# Patient Record
Sex: FEMALE | Race: WHITE | Employment: FULL TIME | URBAN - METROPOLITAN AREA
[De-identification: names, ages, dates, MRNs, and addresses within clinical notes are randomized per-mention and may not be internally consistent; named-entity substitution may affect disease eponyms.]

---

## 2017-01-11 ENCOUNTER — OP HISTORICAL/CONVERTED ENCOUNTER (OUTPATIENT)
Dept: OTHER | Age: 27
End: 2017-01-11

## 2017-02-01 ENCOUNTER — OP HISTORICAL/CONVERTED ENCOUNTER (OUTPATIENT)
Dept: OTHER | Age: 27
End: 2017-02-01

## 2017-03-29 ENCOUNTER — OP HISTORICAL/CONVERTED ENCOUNTER (OUTPATIENT)
Dept: OTHER | Age: 27
End: 2017-03-29

## 2017-05-11 ENCOUNTER — IP HISTORICAL/CONVERTED ENCOUNTER (OUTPATIENT)
Dept: OTHER | Age: 27
End: 2017-05-11

## 2019-06-11 LAB — PAP SMEAR, EXTERNAL: NORMAL

## 2020-06-10 LAB
GRBS, EXTERNAL: NEGATIVE
HBSAG, EXTERNAL: NEGATIVE
HIV, EXTERNAL: NEGATIVE
RPR, EXTERNAL: NONREACTIVE
RUBELLA, EXTERNAL: NORMAL

## 2020-08-31 VITALS
DIASTOLIC BLOOD PRESSURE: 71 MMHG | HEIGHT: 64 IN | HEART RATE: 79 BPM | SYSTOLIC BLOOD PRESSURE: 112 MMHG | TEMPERATURE: 98.6 F | WEIGHT: 149.8 LBS | BODY MASS INDEX: 25.57 KG/M2

## 2020-08-31 PROBLEM — Z91.89 AT HIGH RISK FOR BREAST CANCER: Status: ACTIVE | Noted: 2019-09-27

## 2020-08-31 RX ORDER — FLUVOXAMINE MALEATE 100 MG/1
TABLET, COATED ORAL EVERY EVENING
COMMUNITY
End: 2020-11-20 | Stop reason: ALTCHOICE

## 2020-08-31 RX ORDER — NORGESTIMATE AND ETHINYL ESTRADIOL 0.25-0.035
1 KIT ORAL DAILY
COMMUNITY
End: 2020-11-20 | Stop reason: ALTCHOICE

## 2020-09-01 ENCOUNTER — ROUTINE PRENATAL (OUTPATIENT)
Dept: OBGYN CLINIC | Age: 30
End: 2020-09-01
Payer: COMMERCIAL

## 2020-09-01 VITALS
BODY MASS INDEX: 26.8 KG/M2 | DIASTOLIC BLOOD PRESSURE: 73 MMHG | WEIGHT: 157 LBS | HEART RATE: 89 BPM | HEIGHT: 64 IN | SYSTOLIC BLOOD PRESSURE: 113 MMHG

## 2020-09-01 DIAGNOSIS — Z34.82 MULTIGRAVIDA IN SECOND TRIMESTER: ICD-10-CM

## 2020-09-01 DIAGNOSIS — O34.211 MATERNAL CARE DUE TO LOW TRANSVERSE UTERINE SCAR FROM PREVIOUS CESAREAN DELIVERY: ICD-10-CM

## 2020-09-01 DIAGNOSIS — Z34.92 PRENATAL CARE IN SECOND TRIMESTER: Primary | ICD-10-CM

## 2020-09-01 PROCEDURE — 0500F INITIAL PRENATAL CARE VISIT: CPT | Performed by: OBSTETRICS & GYNECOLOGY

## 2020-09-01 NOTE — PROGRESS NOTES
patient is a 39-year-old white female,  2 para 1001, last menstrual period 2020, [facility] 2021, estimated gestational age 24 weeks 4 days, history of primary  section for breech presentation, oligohydramnios on May 11, 2017, history of JOI-1 by colposcopic biopsy 2018, who presents for transfer of care. Patient reports clear white vaginal discharge, but denies itching odor pelvic pain or vaginal bleeding. She also denies bleeding, contractions, leakage of fluid and reports normal fetal movement. She is sexually active with the same partner since 2016. Last sexual encounter was 4 days ago, and she denies any pain or bleeding with intercourse. 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   Patient is a nonsmoker    Last Pap smear-2018-LGSIL    The labs reviewed  WBC 7.9  Hemoglobin 12.1  Hematocrit 35.5  Platelet count 207,377  Gonorrhea negative  Chlamydia negative  Hepatitis B surface antigen negative  Hepatitis C antibody negative  HIV negative  RPR nonreactive  Rubella immune  Trichomonas negative    Ultrasound 2020 last menstrual period 2020  Crown-rump length is consistent with 9 weeks 3 days    Past Medical History:   Diagnosis Date    Abnormal Papanicolaou smear of cervix 2019    LSIL    At high risk for breast cancer 2019     Past Surgical History:   Procedure Laterality Date    HX  SECTION      HX COLPOSCOPY      HX FREE SKIN GRAFT        Social History     Socioeconomic History    Marital status: LEGALLY      Spouse name: Not on file    Number of children: Not on file    Years of education: Not on file    Highest education level: Not on file   Tobacco Use    Smoking status: Never Smoker    Smokeless tobacco: Never Used   Substance and Sexual Activity    Alcohol use: Not Currently     Comment: Moderate    Drug use: Never    Sexual activity: Yes      Family History   Problem Relation Age of Onset    Breast Cancer Mother     Hypertension Father     Lung Cancer Maternal Uncle     Lung Cancer Paternal Uncle     Lung Cancer Paternal Grandfather         esophagus      Current Outpatient Medications on File Prior to Visit   Medication Sig Dispense Refill    fluvoxaMINE (LUVOX) 100 mg tablet Take  by mouth every evening.  norgestimate-ethinyl estradioL (Sprintec, 28,) 0.25-35 mg-mcg tab Take 1 Tab by mouth daily. No current facility-administered medications on file prior to visit. Allergies as of 09/01/2020 - never reviewed   Allergen Reaction Noted    Macrodantin [nitrofurantoin macrocrystal] Unable to Obtain 08/31/2020     Constitutional  General Appearance: healthy-appearing, well-nourished, well-developed (white female)  Level of Distress: NAD  Ambulation: ambulating normally    Psychiatric  Insight: good judgement  Mental Status: active and alert, normal mood, normal affect  Orientation: to time, to place, to person  Memory: recent memory normal, remote memory normal    Head  Head: normocephalic, atraumatic    Neck  Neck: supple, trachea midline, no masses, FROM  Lymph Nodes: no cervical LAD, no supraclavicular LAD, no axillary LAD, no inguinal LAD  Thyroid: no enlargement, non-tender, no nodules    Lungs  Respiratory effort: no dyspnea  Auscultation: breath sounds normal, good air movement, CTA except as noted, no wheezing, no rales/crackles, no rhonchi    Cardiovascular  Heart Auscultation: RRR, normal S1, normal S2, no murmurs, no rubs, no gallops  Pulses including femoral / pedal: normal throughout    Abdomen  Bowel Sounds: normal  Inspection and Palpation: soft, good, no tenderness, no guarding, no rebound tenderness, no masses, no CVA tenderness (well-healed Pfannenstiel incision)  Liver: non-tender, no hepatomegaly. Ultrasound demonstrates fetus in cephalic presentation; gender appears to be that of a female infant.   Fetal heart activity noted.  Fundal height 18 cm  Spleen: non-tender, no splenomegaly  Hernia: none palpable      Musculoskeletal:  Motor Strength and Tone: normal motor strength, normal tone  Joints, Bones, and Muscles: normal movement of all extremities, no bony abnormalities, no contractures, no malalignment, no tenderness  Extremities: no cyanosis, no edema, no varicosities, no palpable cord    Neurologic  Gait and Station: normal gait, normal station  Sensation: grossly intact  Reflexes: DTRs 2+ bilaterally throughout    Skin  Inspection and palpation: no rash, no lesions, no ulcer, no abnormal nevi, no induration, no nodules, good turgor, no jaundice, tattoo  Nails: normal    Back  Thoracolumbar Appearance: normal curvature      ICD-10-CM ICD-9-CM    1. Prenatal care in second trimester  Z34.92 V22.1 US PREG UTS > 14 WKS SNGL      AFP, MATERNAL SCREEN      GLUCOSE, GESTATIONAL 1 HR TOLERANCE      [id number] PLUS CORE (CHR21,18,13,SEX)   2. Multigravida in second trimester  Z34.82 V22.1    3.  Maternal care due to low transverse uterine scar from previous  delivery  O34.211 654.20      Labs reviewed  Still needs trisomy analysis, AFP, 1 hour glucose tolerance test  Prior section, will need counseling regarding  versus repeat   Also needs anatomy scan  Follow-up in 4 weeks

## 2020-10-08 ENCOUNTER — ROUTINE PRENATAL (OUTPATIENT)
Dept: OBGYN CLINIC | Age: 30
End: 2020-10-08
Payer: COMMERCIAL

## 2020-10-08 ENCOUNTER — HOSPITAL ENCOUNTER (OUTPATIENT)
Dept: MAMMOGRAPHY | Age: 30
Discharge: HOME | End: 2020-10-08
Attending: OBSTETRICS & GYNECOLOGY
Payer: COMMERCIAL

## 2020-10-08 VITALS
HEIGHT: 64 IN | WEIGHT: 161 LBS | SYSTOLIC BLOOD PRESSURE: 123 MMHG | BODY MASS INDEX: 27.49 KG/M2 | DIASTOLIC BLOOD PRESSURE: 69 MMHG

## 2020-10-08 DIAGNOSIS — Z34.82 MULTIGRAVIDA IN SECOND TRIMESTER: Primary | ICD-10-CM

## 2020-10-08 DIAGNOSIS — Z34.92 PRENATAL CARE IN SECOND TRIMESTER: ICD-10-CM

## 2020-10-08 DIAGNOSIS — O34.211 MATERNAL CARE DUE TO LOW TRANSVERSE UTERINE SCAR FROM PREVIOUS CESAREAN DELIVERY: ICD-10-CM

## 2020-10-08 PROCEDURE — 0500F INITIAL PRENATAL CARE VISIT: CPT | Performed by: OBSTETRICS & GYNECOLOGY

## 2020-10-08 PROCEDURE — 76805 OB US >/= 14 WKS SNGL FETUS: CPT

## 2020-10-15 ENCOUNTER — TELEPHONE (OUTPATIENT)
Dept: OBGYN CLINIC | Age: 30
End: 2020-10-15

## 2020-10-15 DIAGNOSIS — O35.EXX0 PYELECTASIS OF FETUS ON PRENATAL ULTRASOUND: Primary | ICD-10-CM

## 2020-10-15 NOTE — TELEPHONE ENCOUNTER
----- Message from Cori Tinajero MD sent at 10/15/2020  7:52 AM EDT -----  Needs MFM scan for pyelectasis

## 2020-10-15 NOTE — TELEPHONE ENCOUNTER
Spoke with patient advised that she needs to see high risk for pylectasis discussed with patient and advised not to be alarmed. Advised that it is relatively common finding in fetuses and sometimes resolves normally. Advised that they will discuss with her and she may need to see them frequently to follow-up. She voiced understanding.

## 2020-11-03 ENCOUNTER — ROUTINE PRENATAL (OUTPATIENT)
Dept: OBGYN CLINIC | Age: 30
End: 2020-11-03
Payer: COMMERCIAL

## 2020-11-03 VITALS
SYSTOLIC BLOOD PRESSURE: 121 MMHG | WEIGHT: 166 LBS | DIASTOLIC BLOOD PRESSURE: 64 MMHG | HEIGHT: 66 IN | BODY MASS INDEX: 26.68 KG/M2

## 2020-11-03 DIAGNOSIS — O34.211 MATERNAL CARE DUE TO LOW TRANSVERSE UTERINE SCAR FROM PREVIOUS CESAREAN DELIVERY: ICD-10-CM

## 2020-11-03 DIAGNOSIS — O35.EXX0 PYELECTASIS OF FETUS ON PRENATAL ULTRASOUND: ICD-10-CM

## 2020-11-03 DIAGNOSIS — Z34.83 MULTIGRAVIDA IN THIRD TRIMESTER: Primary | ICD-10-CM

## 2020-11-03 PROCEDURE — 0502F SUBSEQUENT PRENATAL CARE: CPT | Performed by: OBSTETRICS & GYNECOLOGY

## 2020-11-03 NOTE — PROGRESS NOTES
ket- 80 mg    #1 fetal pyelectasis-patient seen at [facility] today, and was told that the baby's kidneys look normal-no pyelectasis seen; written report is not available as yet  2. History of prior  section, patient was counseled regarding risks and benefits of  versus repeat  section. Specific risks of  discussed included uterine rupture, with fetal sequela of hypoxia, asphyxia, cerebral palsy, and death. Risk versus repeat  section were discussed including bleeding, transfusions, infections, visceral injuries to bowel, bladder, vascular structures, ureters. Patient was also counseled regarding potential risks of abruption and placenta accreta with successive repeat  section. Logistics and rationale of hospital coverage of both anesthesia, and obstetrical staff discussed. Several scenarios were discussed. Limitations of available cervical ripening agents were discussed. Potential increased risk of ruptured was discussed with the use of prostaglandins. Patient was given an opportunity to ask questions, all questions were answered, patient voiced understanding of above.     Tdap, flu vaccine discussed Patient states she is already gotten the flu vaccine -Tdap recommended

## 2020-11-20 ENCOUNTER — ROUTINE PRENATAL (OUTPATIENT)
Dept: OBGYN CLINIC | Age: 30
End: 2020-11-20
Payer: COMMERCIAL

## 2020-11-20 VITALS
WEIGHT: 169 LBS | DIASTOLIC BLOOD PRESSURE: 70 MMHG | BODY MASS INDEX: 27.28 KG/M2 | SYSTOLIC BLOOD PRESSURE: 120 MMHG | HEART RATE: 97 BPM

## 2020-11-20 DIAGNOSIS — O34.211 MATERNAL CARE DUE TO LOW TRANSVERSE UTERINE SCAR FROM PREVIOUS CESAREAN DELIVERY: ICD-10-CM

## 2020-11-20 DIAGNOSIS — Z34.83 MULTIGRAVIDA IN THIRD TRIMESTER: Primary | ICD-10-CM

## 2020-11-20 PROCEDURE — 0502F SUBSEQUENT PRENATAL CARE: CPT | Performed by: OBSTETRICS & GYNECOLOGY

## 2020-11-28 NOTE — PROGRESS NOTES
1.  Fetal pyelectasis-was not seen at MFM scan  2.   Prior D-nadxcdk-fibolcdkhzb  versus repeat     Had flu vaccine, and Tdap

## 2020-12-08 ENCOUNTER — TELEPHONE (OUTPATIENT)
Dept: OBGYN CLINIC | Age: 30
End: 2020-12-08

## 2020-12-08 NOTE — TELEPHONE ENCOUNTER
J Carlos Walker called pt is asking for a continuity of coverage so she can have the baby at [facility]. The pt has already done all of her paperwork for them but now they need a call placed to them at [address] [place] [address] to get approval from Dr. Deja Worthington also. Please give them a call as soon as possible to get this taken care of.  The number for [facility] is # [phone]

## 2020-12-21 ENCOUNTER — ROUTINE PRENATAL (OUTPATIENT)
Dept: OBGYN CLINIC | Age: 30
End: 2020-12-21
Payer: COMMERCIAL

## 2020-12-21 VITALS
HEIGHT: 64 IN | HEART RATE: 83 BPM | DIASTOLIC BLOOD PRESSURE: 73 MMHG | SYSTOLIC BLOOD PRESSURE: 125 MMHG | BODY MASS INDEX: 29.88 KG/M2 | WEIGHT: 175 LBS

## 2020-12-21 DIAGNOSIS — Z34.83 MULTIGRAVIDA IN THIRD TRIMESTER: Primary | ICD-10-CM

## 2020-12-21 DIAGNOSIS — O34.211 MATERNAL CARE DUE TO LOW TRANSVERSE UTERINE SCAR FROM PREVIOUS CESAREAN DELIVERY: ICD-10-CM

## 2020-12-21 DIAGNOSIS — O35.EXX0 PYELECTASIS OF FETUS ON PRENATAL ULTRASOUND: ICD-10-CM

## 2020-12-21 PROCEDURE — 0502F SUBSEQUENT PRENATAL CARE: CPT | Performed by: OBSTETRICS & GYNECOLOGY

## 2020-12-21 NOTE — PROGRESS NOTES
1. Prior C-nnzyfka-tatndtugjqd  versus repeat  discussed considerations once again  2.   Fetal pyelectasis

## 2020-12-24 LAB — B-HEM STREP SPEC QL CULT: NEGATIVE

## 2020-12-28 ENCOUNTER — ROUTINE PRENATAL (OUTPATIENT)
Dept: OBGYN CLINIC | Age: 30
End: 2020-12-28
Payer: COMMERCIAL

## 2020-12-28 VITALS
DIASTOLIC BLOOD PRESSURE: 93 MMHG | SYSTOLIC BLOOD PRESSURE: 127 MMHG | HEART RATE: 106 BPM | BODY MASS INDEX: 29.87 KG/M2 | WEIGHT: 174 LBS

## 2020-12-28 DIAGNOSIS — O34.211 MATERNAL CARE DUE TO LOW TRANSVERSE UTERINE SCAR FROM PREVIOUS CESAREAN DELIVERY: ICD-10-CM

## 2020-12-28 DIAGNOSIS — Z13.6 SCREENING FOR CARDIOVASCULAR, RESPIRATORY, AND GENITOURINARY DISEASES: ICD-10-CM

## 2020-12-28 DIAGNOSIS — Z34.83 MULTIGRAVIDA IN THIRD TRIMESTER: Primary | ICD-10-CM

## 2020-12-28 DIAGNOSIS — Z13.83 SCREENING FOR CARDIOVASCULAR, RESPIRATORY, AND GENITOURINARY DISEASES: ICD-10-CM

## 2020-12-28 DIAGNOSIS — Z13.89 SCREENING FOR CARDIOVASCULAR, RESPIRATORY, AND GENITOURINARY DISEASES: ICD-10-CM

## 2020-12-28 PROCEDURE — 0502F SUBSEQUENT PRENATAL CARE: CPT | Performed by: OBSTETRICS & GYNECOLOGY

## 2020-12-28 NOTE — PROGRESS NOTES
1. Prior section-considering  versus repeat section  2.   Fetal pyelectasis    GBS pending  Covid ordered

## 2021-01-07 ENCOUNTER — ROUTINE PRENATAL (OUTPATIENT)
Dept: OBGYN CLINIC | Age: 31
End: 2021-01-07
Payer: COMMERCIAL

## 2021-01-07 ENCOUNTER — HOSPITAL ENCOUNTER (OUTPATIENT)
Dept: PREADMISSION TESTING | Age: 31
Discharge: HOME | End: 2021-01-07
Payer: COMMERCIAL

## 2021-01-07 VITALS
DIASTOLIC BLOOD PRESSURE: 72 MMHG | SYSTOLIC BLOOD PRESSURE: 105 MMHG | HEIGHT: 64 IN | BODY MASS INDEX: 30.56 KG/M2 | WEIGHT: 179 LBS | HEART RATE: 94 BPM

## 2021-01-07 DIAGNOSIS — O35.EXX0 PYELECTASIS OF FETUS ON PRENATAL ULTRASOUND: ICD-10-CM

## 2021-01-07 DIAGNOSIS — Z34.83 MULTIGRAVIDA IN THIRD TRIMESTER: Primary | ICD-10-CM

## 2021-01-07 DIAGNOSIS — O34.211 MATERNAL CARE DUE TO LOW TRANSVERSE UTERINE SCAR FROM PREVIOUS CESAREAN DELIVERY: ICD-10-CM

## 2021-01-07 LAB — SARS-COV-2, COV2: NORMAL

## 2021-01-07 PROCEDURE — 87635 SARS-COV-2 COVID-19 AMP PRB: CPT

## 2021-01-07 PROCEDURE — 0502F SUBSEQUENT PRENATAL CARE: CPT | Performed by: OBSTETRICS & GYNECOLOGY

## 2021-01-08 LAB — SARS-COV-2, COV2NT: NOT DETECTED

## 2021-01-13 ENCOUNTER — ROUTINE PRENATAL (OUTPATIENT)
Dept: OBGYN CLINIC | Age: 31
End: 2021-01-13
Payer: COMMERCIAL

## 2021-01-13 VITALS
WEIGHT: 178 LBS | HEIGHT: 64 IN | DIASTOLIC BLOOD PRESSURE: 70 MMHG | SYSTOLIC BLOOD PRESSURE: 120 MMHG | BODY MASS INDEX: 30.39 KG/M2

## 2021-01-13 DIAGNOSIS — Z34.80 SUPERVISION OF OTHER NORMAL PREGNANCY: Primary | ICD-10-CM

## 2021-01-13 DIAGNOSIS — Z34.83 MULTIGRAVIDA IN THIRD TRIMESTER: ICD-10-CM

## 2021-01-13 DIAGNOSIS — O34.211 MATERNAL CARE DUE TO LOW TRANSVERSE UTERINE SCAR FROM PREVIOUS CESAREAN DELIVERY: ICD-10-CM

## 2021-01-13 LAB
BILIRUB UR QL STRIP: NEGATIVE
GLUCOSE UR-MCNC: NEGATIVE MG/DL
KETONES P FAST UR STRIP-MCNC: NEGATIVE MG/DL
PH UR STRIP: 6.5 [PH] (ref 4.6–8)
PROT UR QL STRIP: NEGATIVE
SP GR UR STRIP: 1.02 (ref 1–1.03)
UA UROBILINOGEN AMB POC: NORMAL (ref 0.2–1)
URINALYSIS CLARITY POC: CLEAR
URINALYSIS COLOR POC: YELLOW
URINE BLOOD POC: NEGATIVE
URINE LEUKOCYTES POC: NEGATIVE
URINE NITRITES POC: NEGATIVE

## 2021-01-13 PROCEDURE — 0502F SUBSEQUENT PRENATAL CARE: CPT | Performed by: OBSTETRICS & GYNECOLOGY

## 2021-01-17 NOTE — PROGRESS NOTES
1. Prior E-pzygetr-onqtj of  once again reviewed including uterine rupture,, fetal hypoxia, asphyxia, cerebral palsy, death, versus risks of  section including bleeding, transfusions, visceral injuries to bowel and bladder, as well as increased risk of placental complications such as placenta previa, and placental abruption. Logistics and rationale of  policies discussed. Patient was given an opportunity to ask questions, all questions were answered, patient voiced understanding of above.   #2 fetal pyelectasis

## 2021-01-18 ENCOUNTER — HOSPITAL ENCOUNTER (OUTPATIENT)
Dept: PREADMISSION TESTING | Age: 31
Discharge: HOME | End: 2021-01-18
Payer: COMMERCIAL

## 2021-01-18 LAB — SARS-COV-2, COV2: NORMAL

## 2021-01-18 PROCEDURE — U0003 INFECTIOUS AGENT DETECTION BY NUCLEIC ACID (DNA OR RNA); SEVERE ACUTE RESPIRATORY SYNDROME CORONAVIRUS 2 (SARS-COV-2) (CORONAVIRUS DISEASE [COVID-19]), AMPLIFIED PROBE TECHNIQUE, MAKING USE OF HIGH THROUGHPUT TECHNOLOGIES AS DESCRIBED BY CMS-2020-01-R: HCPCS

## 2021-01-19 LAB — SARS-COV-2, COV2NT: NOT DETECTED

## 2021-01-21 ENCOUNTER — ROUTINE PRENATAL (OUTPATIENT)
Dept: OBGYN CLINIC | Age: 31
End: 2021-01-21
Payer: COMMERCIAL

## 2021-01-21 VITALS
DIASTOLIC BLOOD PRESSURE: 70 MMHG | HEIGHT: 67 IN | WEIGHT: 179 LBS | SYSTOLIC BLOOD PRESSURE: 123 MMHG | BODY MASS INDEX: 28.09 KG/M2

## 2021-01-21 DIAGNOSIS — Z34.83 MULTIGRAVIDA IN THIRD TRIMESTER: Primary | ICD-10-CM

## 2021-01-21 DIAGNOSIS — O34.211 MATERNAL CARE DUE TO LOW TRANSVERSE UTERINE SCAR FROM PREVIOUS CESAREAN DELIVERY: ICD-10-CM

## 2021-01-21 DIAGNOSIS — O35.EXX0 PYELECTASIS OF FETUS ON PRENATAL ULTRASOUND: ICD-10-CM

## 2021-01-21 PROCEDURE — 0502F SUBSEQUENT PRENATAL CARE: CPT | Performed by: OBSTETRICS & GYNECOLOGY

## 2021-01-22 ENCOUNTER — ANESTHESIA (OUTPATIENT)
Dept: LABOR AND DELIVERY | Age: 31
End: 2021-01-22
Payer: COMMERCIAL

## 2021-01-22 ENCOUNTER — ANESTHESIA EVENT (OUTPATIENT)
Dept: LABOR AND DELIVERY | Age: 31
End: 2021-01-22
Payer: COMMERCIAL

## 2021-01-22 ENCOUNTER — HOSPITAL ENCOUNTER (INPATIENT)
Age: 31
LOS: 2 days | Discharge: HOME | End: 2021-01-24
Attending: OBSTETRICS & GYNECOLOGY | Admitting: OBSTETRICS & GYNECOLOGY
Payer: COMMERCIAL

## 2021-01-22 VITALS — HEART RATE: 100 BPM | DIASTOLIC BLOOD PRESSURE: 49 MMHG | SYSTOLIC BLOOD PRESSURE: 124 MMHG | OXYGEN SATURATION: 100 %

## 2021-01-22 DIAGNOSIS — O34.219 VBAC, DELIVERED: Primary | ICD-10-CM

## 2021-01-22 PROBLEM — O34.211 ENCOUNTER FOR MATERNAL CARE FOR LOW TRANSVERSE SCAR FROM PREVIOUS CESAREAN DELIVERY: Status: ACTIVE | Noted: 2021-01-22

## 2021-01-22 PROBLEM — Z3A.40 40 WEEKS GESTATION OF PREGNANCY: Status: ACTIVE | Noted: 2021-01-22

## 2021-01-22 LAB
ABO + RH BLD: NORMAL
BASOPHILS # BLD: 0.1 K/UL (ref 0–0.1)
BASOPHILS NFR BLD: 0 % (ref 0–1)
BLOOD GROUP ANTIBODIES SERPL: NEGATIVE
DIFFERENTIAL METHOD BLD: ABNORMAL
EOSINOPHIL # BLD: 0.1 K/UL (ref 0–0.4)
EOSINOPHIL NFR BLD: 1 % (ref 0–7)
ERYTHROCYTE [DISTWIDTH] IN BLOOD BY AUTOMATED COUNT: 14.3 % (ref 11.5–14.5)
HBV SURFACE AG SER QL: <0.1 INDEX
HBV SURFACE AG SER QL: NEGATIVE
HCT VFR BLD AUTO: 32.9 % (ref 35–47)
HGB BLD-MCNC: 10.5 G/DL (ref 11.5–16)
HIV 1+2 AB+HIV1 P24 AG SERPL QL IA: NONREACTIVE
HIV12 RESULT COMMENT, HHIVC: NORMAL
IMM GRANULOCYTES # BLD AUTO: 0.1 K/UL (ref 0–0.04)
IMM GRANULOCYTES NFR BLD AUTO: 1 % (ref 0–0.5)
LYMPHOCYTES # BLD: 3.2 K/UL (ref 0.8–3.5)
LYMPHOCYTES NFR BLD: 25 % (ref 12–49)
MCH RBC QN AUTO: 25.6 PG (ref 26–34)
MCHC RBC AUTO-ENTMCNC: 31.9 G/DL (ref 30–36.5)
MCV RBC AUTO: 80.2 FL (ref 80–99)
MONOCYTES # BLD: 1.1 K/UL (ref 0–1)
MONOCYTES NFR BLD: 9 % (ref 5–13)
NEUTS SEG # BLD: 8.2 K/UL (ref 1.8–8)
NEUTS SEG NFR BLD: 64 % (ref 32–75)
PLATELET # BLD AUTO: 435 K/UL (ref 150–400)
PMV BLD AUTO: 9.6 FL (ref 8.9–12.9)
RBC # BLD AUTO: 4.1 M/UL (ref 3.8–5.2)
RUBV IGG SER-IMP: REACTIVE
RUBV IGG SERPL IA-ACNC: 21.5 IU/ML
SPECIMEN EXP DATE BLD: NORMAL
WBC # BLD AUTO: 12.7 K/UL (ref 3.6–11)

## 2021-01-22 PROCEDURE — 81001 URINALYSIS AUTO W/SCOPE: CPT

## 2021-01-22 PROCEDURE — 74011250636 HC RX REV CODE- 250/636: Performed by: OBSTETRICS & GYNECOLOGY

## 2021-01-22 PROCEDURE — 59200 INSERT CERVICAL DILATOR: CPT

## 2021-01-22 PROCEDURE — 74011250637 HC RX REV CODE- 250/637: Performed by: OBSTETRICS & GYNECOLOGY

## 2021-01-22 PROCEDURE — 87086 URINE CULTURE/COLONY COUNT: CPT

## 2021-01-22 PROCEDURE — 77030014125 HC TY EPDRL BBMI -B: Performed by: ANESTHESIOLOGY

## 2021-01-22 PROCEDURE — 75410000003 HC RECOV DEL/VAG/CSECN EA 0.5 HR

## 2021-01-22 PROCEDURE — 80081 OBSTETRIC PANEL INC HIV TSTG: CPT

## 2021-01-22 PROCEDURE — 74011000250 HC RX REV CODE- 250: Performed by: NURSE ANESTHETIST, CERTIFIED REGISTERED

## 2021-01-22 PROCEDURE — 74011250636 HC RX REV CODE- 250/636: Performed by: NURSE ANESTHETIST, CERTIFIED REGISTERED

## 2021-01-22 PROCEDURE — 0KQM0ZZ REPAIR PERINEUM MUSCLE, OPEN APPROACH: ICD-10-PCS | Performed by: OBSTETRICS & GYNECOLOGY

## 2021-01-22 PROCEDURE — 74011000250 HC RX REV CODE- 250

## 2021-01-22 PROCEDURE — 75410000000 HC DELIVERY VAGINAL/SINGLE

## 2021-01-22 PROCEDURE — 3E0S3BZ INTRODUCTION OF ANESTHETIC AGENT INTO EPIDURAL SPACE, PERCUTANEOUS APPROACH: ICD-10-PCS | Performed by: OBSTETRICS & GYNECOLOGY

## 2021-01-22 PROCEDURE — 36415 COLL VENOUS BLD VENIPUNCTURE: CPT

## 2021-01-22 PROCEDURE — 75410000002 HC LABOR FEE PER 1 HR

## 2021-01-22 PROCEDURE — 65410000002 HC RM PRIVATE OB

## 2021-01-22 PROCEDURE — 76060000078 HC EPIDURAL ANESTHESIA

## 2021-01-22 RX ORDER — FENTANYL/BUPIVACAINE/NS/PF 2-1250MCG
1-18 PREFILLED PUMP RESERVOIR EPIDURAL
Status: DISCONTINUED | OUTPATIENT
Start: 2021-01-22 | End: 2021-01-22

## 2021-01-22 RX ORDER — SODIUM CHLORIDE 0.9 % (FLUSH) 0.9 %
5-40 SYRINGE (ML) INJECTION EVERY 8 HOURS
Status: DISCONTINUED | OUTPATIENT
Start: 2021-01-22 | End: 2021-01-24 | Stop reason: HOSPADM

## 2021-01-22 RX ORDER — OXYCODONE AND ACETAMINOPHEN 5; 325 MG/1; MG/1
1 TABLET ORAL
Status: DISCONTINUED | OUTPATIENT
Start: 2021-01-22 | End: 2021-01-24 | Stop reason: HOSPADM

## 2021-01-22 RX ORDER — LIDOCAINE HYDROCHLORIDE AND EPINEPHRINE 15; 5 MG/ML; UG/ML
INJECTION, SOLUTION EPIDURAL
Status: SHIPPED | OUTPATIENT
Start: 2021-01-22 | End: 2021-01-22

## 2021-01-22 RX ORDER — ONDANSETRON 2 MG/ML
4 INJECTION INTRAMUSCULAR; INTRAVENOUS
Status: DISCONTINUED | OUTPATIENT
Start: 2021-01-22 | End: 2021-01-23 | Stop reason: HOSPADM

## 2021-01-22 RX ORDER — ONDANSETRON 2 MG/ML
4 INJECTION INTRAMUSCULAR; INTRAVENOUS AS NEEDED
Status: DISCONTINUED | OUTPATIENT
Start: 2021-01-22 | End: 2021-01-22

## 2021-01-22 RX ORDER — OXYTOCIN/0.9 % SODIUM CHLORIDE 30/500 ML
2 PLASTIC BAG, INJECTION (ML) INTRAVENOUS
Status: DISCONTINUED | OUTPATIENT
Start: 2021-01-22 | End: 2021-01-22

## 2021-01-22 RX ORDER — BISACODYL 5 MG
5 TABLET, DELAYED RELEASE (ENTERIC COATED) ORAL DAILY PRN
Status: DISCONTINUED | OUTPATIENT
Start: 2021-01-22 | End: 2021-01-23 | Stop reason: HOSPADM

## 2021-01-22 RX ORDER — NALOXONE HYDROCHLORIDE 0.4 MG/ML
0.4 INJECTION, SOLUTION INTRAMUSCULAR; INTRAVENOUS; SUBCUTANEOUS AS NEEDED
Status: DISCONTINUED | OUTPATIENT
Start: 2021-01-22 | End: 2021-01-24 | Stop reason: HOSPADM

## 2021-01-22 RX ORDER — EPHEDRINE SULFATE/0.9% NACL/PF 50 MG/5 ML
SYRINGE (ML) INTRAVENOUS AS NEEDED
Status: DISCONTINUED | OUTPATIENT
Start: 2021-01-22 | End: 2021-01-22 | Stop reason: HOSPADM

## 2021-01-22 RX ORDER — OXYTOCIN/RINGER'S LACTATE 30/500 ML
87.3 PLASTIC BAG, INJECTION (ML) INTRAVENOUS AS NEEDED
Status: COMPLETED | OUTPATIENT
Start: 2021-01-22 | End: 2021-01-22

## 2021-01-22 RX ORDER — BUPIVACAINE HYDROCHLORIDE 2.5 MG/ML
INJECTION, SOLUTION EPIDURAL; INFILTRATION; INTRACAUDAL
Status: COMPLETED
Start: 2021-01-22 | End: 2021-01-22

## 2021-01-22 RX ORDER — SODIUM CHLORIDE 0.9 % (FLUSH) 0.9 %
5-40 SYRINGE (ML) INJECTION AS NEEDED
Status: DISCONTINUED | OUTPATIENT
Start: 2021-01-22 | End: 2021-01-24 | Stop reason: HOSPADM

## 2021-01-22 RX ORDER — ZOLPIDEM TARTRATE 5 MG/1
5 TABLET ORAL
Status: DISCONTINUED | OUTPATIENT
Start: 2021-01-22 | End: 2021-01-24 | Stop reason: HOSPADM

## 2021-01-22 RX ORDER — HYDROCORTISONE ACETATE PRAMOXINE HCL 1; 1 G/100G; G/100G
CREAM TOPICAL AS NEEDED
Status: DISCONTINUED | OUTPATIENT
Start: 2021-01-22 | End: 2021-01-23

## 2021-01-22 RX ORDER — BUPIVACAINE HYDROCHLORIDE 2.5 MG/ML
INJECTION, SOLUTION EPIDURAL; INFILTRATION; INTRACAUDAL AS NEEDED
Status: DISCONTINUED | OUTPATIENT
Start: 2021-01-22 | End: 2021-01-22 | Stop reason: HOSPADM

## 2021-01-22 RX ORDER — PHENYLEPHRINE HCL IN 0.9% NACL 1 MG/10 ML
SYRINGE (ML) INTRAVENOUS
Status: DISPENSED
Start: 2021-01-22 | End: 2021-01-22

## 2021-01-22 RX ORDER — DIPHENHYDRAMINE HYDROCHLORIDE 50 MG/ML
12.5 INJECTION, SOLUTION INTRAMUSCULAR; INTRAVENOUS
Status: DISCONTINUED | OUTPATIENT
Start: 2021-01-22 | End: 2021-01-24 | Stop reason: HOSPADM

## 2021-01-22 RX ORDER — SODIUM CHLORIDE, SODIUM LACTATE, POTASSIUM CHLORIDE, CALCIUM CHLORIDE 600; 310; 30; 20 MG/100ML; MG/100ML; MG/100ML; MG/100ML
125 INJECTION, SOLUTION INTRAVENOUS CONTINUOUS
Status: DISCONTINUED | OUTPATIENT
Start: 2021-01-22 | End: 2021-01-22

## 2021-01-22 RX ORDER — NORETHINDRONE AND ETHINYL ESTRADIOL 0.5-0.035
KIT ORAL
Status: DISCONTINUED
Start: 2021-01-22 | End: 2021-01-22

## 2021-01-22 RX ORDER — NORETHINDRONE AND ETHINYL ESTRADIOL 0.5-0.035
12.5 KIT ORAL ONCE
Status: DISCONTINUED | OUTPATIENT
Start: 2021-01-22 | End: 2021-01-22

## 2021-01-22 RX ORDER — LIDOCAINE HYDROCHLORIDE 10 MG/ML
INJECTION INFILTRATION; PERINEURAL
Status: COMPLETED
Start: 2021-01-22 | End: 2021-01-22

## 2021-01-22 RX ORDER — IBUPROFEN 800 MG/1
800 TABLET ORAL EVERY 8 HOURS
Status: DISCONTINUED | OUTPATIENT
Start: 2021-01-22 | End: 2021-01-24 | Stop reason: HOSPADM

## 2021-01-22 RX ORDER — FENTANYL 0.2 MG/100ML-BUPIV 0.125%-NACL 0.9% EPIDURAL INJ 2/0.125 MCG/ML-%
SOLUTION INJECTION
Status: COMPLETED
Start: 2021-01-22 | End: 2021-01-22

## 2021-01-22 RX ORDER — OXYTOCIN/RINGER'S LACTATE 30/500 ML
10 PLASTIC BAG, INJECTION (ML) INTRAVENOUS AS NEEDED
Status: COMPLETED | OUTPATIENT
Start: 2021-01-22 | End: 2021-01-22

## 2021-01-22 RX ORDER — NALOXONE HYDROCHLORIDE 0.4 MG/ML
0.4 INJECTION, SOLUTION INTRAMUSCULAR; INTRAVENOUS; SUBCUTANEOUS AS NEEDED
Status: DISCONTINUED | OUTPATIENT
Start: 2021-01-22 | End: 2021-01-22

## 2021-01-22 RX ADMIN — Medication 8 ML/HR: at 11:10

## 2021-01-22 RX ADMIN — OXYTOCIN-SODIUM CHLORIDE 0.9% IV SOLN 30 UNIT/500ML 10000 MILLI-UNITS: 30-0.9/5 SOLUTION at 22:00

## 2021-01-22 RX ADMIN — BUPIVACAINE HYDROCHLORIDE 10 ML: 2.5 INJECTION, SOLUTION EPIDURAL; INFILTRATION; INTRACAUDAL at 10:28

## 2021-01-22 RX ADMIN — DIPHENHYDRAMINE HYDROCHLORIDE 12.5 MG: 50 INJECTION, SOLUTION INTRAMUSCULAR; INTRAVENOUS at 14:33

## 2021-01-22 RX ADMIN — OXYTOCIN-SODIUM CHLORIDE 0.9% IV SOLN 30 UNIT/500ML 8 MILLI-UNITS/MIN: 30-0.9/5 SOLUTION at 18:20

## 2021-01-22 RX ADMIN — LIDOCAINE HYDROCHLORIDE: 10 INJECTION, SOLUTION INFILTRATION; PERINEURAL at 21:35

## 2021-01-22 RX ADMIN — SODIUM CHLORIDE, POTASSIUM CHLORIDE, SODIUM LACTATE AND CALCIUM CHLORIDE 125 ML/HR: 600; 310; 30; 20 INJECTION, SOLUTION INTRAVENOUS at 11:59

## 2021-01-22 RX ADMIN — PHENYLEPHRINE HYDROCHLORIDE 200 MCG: 10 INJECTION INTRAVENOUS at 10:36

## 2021-01-22 RX ADMIN — OXYTOCIN-SODIUM CHLORIDE 0.9% IV SOLN 30 UNIT/500ML 2 MILLI-UNITS/MIN: 30-0.9/5 SOLUTION at 16:37

## 2021-01-22 RX ADMIN — LIDOCAINE HYDROCHLORIDE AND EPINEPHRINE 3 ML: 15; 5 INJECTION, SOLUTION EPIDURAL at 10:25

## 2021-01-22 RX ADMIN — Medication 87.3 MILLI-UNITS/MIN: at 22:20

## 2021-01-22 RX ADMIN — BUPIVACAINE HYDROCHLORIDE 5 ML: 2.5 INJECTION, SOLUTION EPIDURAL; INFILTRATION; INTRACAUDAL at 20:39

## 2021-01-22 RX ADMIN — Medication 10 MG: at 10:49

## 2021-01-22 RX ADMIN — SODIUM CHLORIDE, POTASSIUM CHLORIDE, SODIUM LACTATE AND CALCIUM CHLORIDE 125 ML/HR: 600; 310; 30; 20 INJECTION, SOLUTION INTRAVENOUS at 07:30

## 2021-01-22 RX ADMIN — IBUPROFEN 800 MG: 800 TABLET ORAL at 23:46

## 2021-01-22 NOTE — PROGRESS NOTES
0550: Pt arrived on the unit with  \"TJ. \" Pt endorses positive fetal movement. Pt denies vaginal bleeding, discharge, or contractions. Pt instructed to change into gown and void in urine cup.

## 2021-01-22 NOTE — ANESTHESIA PREPROCEDURE EVALUATION
Relevant Problems   NEUROLOGY   (+) Generalized anxiety disorder       Anesthetic History   No history of anesthetic complications            Review of Systems / Medical History  Patient summary reviewed, nursing notes reviewed and pertinent labs reviewed    Pulmonary  Within defined limits                 Neuro/Psych   Within defined limits           Cardiovascular  Within defined limits                Exercise tolerance: >4 METS     GI/Hepatic/Renal     GERD: poorly controlled           Endo/Other        Obesity and anemia     Other Findings   Comments:       Anxiety         Physical Exam    Airway  Mallampati: II  TM Distance: 4 - 6 cm  Neck ROM: normal range of motion        Cardiovascular    Rhythm: regular  Rate: normal         Dental  No notable dental hx       Pulmonary  Breath sounds clear to auscultation               Abdominal  Abdominal exam normal      Comments: Pregnant Other Findings            Anesthetic Plan    ASA: 2  Anesthesia type: epidural            Anesthetic plan and risks discussed with: Patient, Family and Spouse

## 2021-01-22 NOTE — PROGRESS NOTES
Bedside shift report received from [address]   0730-Placed call to MD, no new orders received. 0955-anest in room  1022-Start  1024-cath  1025-test  1028-loading dose   1215-MD in to see pt to place cook balloon. 1600-MD in to see pt removed cook balloon, performed SVE see flowsheet. New orders received.  See [facility]

## 2021-01-22 NOTE — ANESTHESIA PROCEDURE NOTES
CSE Block    Start time: 1/22/2021 10:22 AM  End time: 1/22/2021 10:28 AM  Performed by: Rachna Sequeira CRNA  Authorized by: Rachna Sequeira CRNA     Pre-Procedure  Indications: at surgeon's request and primary anesthetic    preanesthetic checklist: patient identified, risks and benefits discussed, anesthesia consent, site marked, patient being monitored and timeout performed    Timeout Time: 10:12        Procedure:   Patient Position:  Seated  Prep Region:  Lumbar  Prep: patient draped and chlorhexidine    Location:  L3-4    Epidural Needle:   Needle Type:  Tuohy  Needle Gauge:  17 G  Injection Technique:  Loss of resistance using saline  Attempts:  1    Catheter:   Events: no blood with aspiration, no cerebrospinal fluid with aspiration, no paresthesia and negative aspiration test    Test Dose:  Negative    Assessment:   Catheter Secured:  Tegaderm and tape  Insertion:  Uncomplicated  Patient tolerance:  Patient tolerated the procedure well with no immediate complications

## 2021-01-22 NOTE — PROGRESS NOTES
5734 - Patient pressed call light stating she needed to use restroom. 1776 - Patient disconnected from monitors to use restroom.

## 2021-01-23 LAB
BACTERIA SPEC CULT: NORMAL
SPECIAL REQUESTS,SREQ: NORMAL

## 2021-01-23 PROCEDURE — 74011250637 HC RX REV CODE- 250/637: Performed by: OBSTETRICS & GYNECOLOGY

## 2021-01-23 PROCEDURE — 65410000002 HC RM PRIVATE OB

## 2021-01-23 RX ORDER — PRAMOXINE HYDROCHLORIDE 10 MG/G
AEROSOL, FOAM TOPICAL
Status: DISCONTINUED | OUTPATIENT
Start: 2021-01-23 | End: 2021-01-24 | Stop reason: HOSPADM

## 2021-01-23 RX ADMIN — Medication 1 PAD: at 00:45

## 2021-01-23 RX ADMIN — PRAMOXINE HYDROCHLORIDE: 10 AEROSOL, FOAM TOPICAL at 02:40

## 2021-01-23 RX ADMIN — IBUPROFEN 800 MG: 800 TABLET ORAL at 08:46

## 2021-01-23 RX ADMIN — IBUPROFEN 800 MG: 800 TABLET ORAL at 16:17

## 2021-01-23 RX ADMIN — IBUPROFEN 800 MG: 800 TABLET ORAL at 23:02

## 2021-01-23 NOTE — PROCEDURES
DELIVERY NOTE    PREOPERATIVE DIAGNOSIS: Intrauterine pregnancy at 40 weeks gestation, previous  section    POSTOPERATIVE DIAGNOSIS: Same,  delivered    PROCEDURE:    Placement of Cook cervical ripening balloon  Pudendal anesthesia  Electronic fetal monitoring    ANESTHESIA: Epidural, pudendal anesthesia    SURGEON: Rahul Marino M.D.    ASSISTANT: GELA Reid 3    COMPLICATIONS: None    CONDITION DURING PROCEDURE: Stable    ESTIMATED BLOOD LOSS: 499 mL    SPECIMEN: None    FINDINGS: Viable female infant, cephalic presentation, ALDO. Apgar's: 9//9. Weight: 7 pounds 13 ounces. Intact placenta. Three-vessel cord. Second-degree perineal laceration, bilateral labial lacerations,    DESCRIPTION OF PROCEDURE: Sofía Power is a 31 y.o. female,  2 para 0-1-0-1, [facility] 2021, estimated gestational age 43 weeks 0 days, with prenatal care at [facility] notable for prior  section, admitted for induction of labor/. She was given an epidural anesthetic initially. Subsequently a Cook cervical ripening balloon was inserted without difficulty and 120 cc / 120 cc were placed, at approximately 12:30 PM, and subsequently removed at 1600. Patient treated with Pitocin, progressed to 7 cm, SROM during exam, with subsequent progression to full dilation. Pudendal block placed to facilitate perineal anesthesia and perineal massage, and patient had relatively brief second stage and delivered spontaneously a viable female infant over an intact perineum, with bilateral labial, and second-degree perineal lacerations. Nuchal cord x1 noted and reduced prior to delivery of shoulders. Infant dried and stimulated below placenta for approximately 60 seconds, cord was clamped and cut and infant was handed to mom for skin to skin. Placenta delivered spontaneously and intact with three-vessel cord.   Vagina examined and her vaginal lacerations were repaired with 2-0 Vicryl and repaired with 2-0 Vicryl with epidural anesthesia, pudendal, and local anesthesia, without difficulty. There were's 2 instances where the vagina was examined, and blood clots were noted in the lower uterine segment, and were evacuated. Ultimately hemostasis was observed. The patient and infant tolerated the delivery well. All counts were correct.

## 2021-01-23 NOTE — PROGRESS NOTES
7303: Bedside shift report received from [address]. Assumed care of patient at this time. 0740: VS obtained and shift assessment completed. Patient denies needing anything at this time. Infant in open crib next to bedside. 2968: Patient sitting up in bed eating breakfast. Scheduled Motrin given. Infant in open crib next to bedside. Patient denies needing anything at this time. 0935: Rounded. Patient resting quietly in bed. Denies needing anything at this time. Infant in open crib next to bedside. 1032: Rounded. Patient sleeping quietly in bed. Infant in open crib next to bedside. 1126: Rounded: Patient sitting up in bed. Infant in open crib next to bedside. Denies needing anything at this time. 1213: Rounded: Patient sitting up in bed. Denies needing anything at this time. Infant currently having hearing screen done. 1320: Rounded. Patient sitting up in bed, holding baby. Denies needing anything at this time. 1420: Rounded. Patient sitting up in bed breastfeeding baby. Denies needing anything at this time. 1430: Dr. Augustin Valiente in to see patient. 1505: Rounded. Patient sitting up in bed with baby. IV to left discontinued, patient tolerated well.    1600: Vital signs obtained. Scheduled Motrin given. Patient denies needing anything at this time. 1724: Rounded. Patient sitting up in bed breastfeeding infant. Denies needing anything at this time. 1810: Patient sitting up in bed eating dinner. Significant other changing infant diaper. Patient denies needing anything at this time. 1855:  Bedside shift report given to Jacqueline Garcia RN.

## 2021-01-23 NOTE — H&P
Labor and Delivery/History & Physical    Primary Care Provider: Ed Hinkle  Source of Information: Patient/family     History of Presenting Illness:   Martine Serrano is a 31 y.o. female,  2 para 0-1-0-1, [facility] 2021, estimated gestational age 43 weeks 0 days, with prenatal care at [facility] notable for prior  section, who presents for induction of labor/. Patient reports sporadic contractions, but denies bleeding or leakage of fluid. She reports normal fetal movement. Review of System    Past Medical History:   Diagnosis Date    Abnormal Papanicolaou smear of cervix 2019    LSIL    At high risk for breast cancer 2019        Past Surgical History:   Procedure Laterality Date    HX  SECTION      HX COLPOSCOPY      HX FREE SKIN GRAFT      HX OTHER SURGICAL         Prior to Admission medications    Medication Sig Start Date End Date Taking? Authorizing Provider   PNV Comb #2-Iron-FA-Omega 3 29-1-400 mg cmpk Take  by mouth.    Yes Provider, Historical       Allergies   Allergen Reactions    Macrodantin [Nitrofurantoin Macrocrystal] Itching    Nitrofurantoin Itching        Family History   Problem Relation Age of Onset    Breast Cancer Mother     Hypertension Father     Lung Cancer Maternal Uncle     Lung Cancer Paternal Uncle     Lung Cancer Paternal Grandfather         esophagus        SOCIAL HISTORY:    Social History     Socioeconomic History    Marital status:      Spouse name: [facility] [place]    Number of children: Not on file    Years of education: Not on file    Highest education level: Associate degree: academic program   Occupational History    Not on file   Social Needs    Financial resource strain: Not hard at all   [place] insecurity     Worry: Never true     Inability: Never true   [employer] needs     Medical: No     Non-medical: No   Tobacco Use    Smoking status: Never Smoker    Smokeless tobacco: Never Used Substance and Sexual Activity    Alcohol use: Not Currently     Comment: Moderate    Drug use: Never    Sexual activity: Yes   Lifestyle    Physical activity     Days per week: Not on file     Minutes per session: Not on file    Stress: Not on file   Relationships    Social connections     Talks on phone: Three times a week     Gets together: Three times a week     Attends Yazdanism service: Not on file     Active member of club or organization: Not on file     Attends meetings of clubs or organizations: Not on file     Relationship status: Not on file    Intimate partner violence     Fear of current or ex partner: No     Emotionally abused: No     Physically abused: No     Forced sexual activity: No   Other Topics Concern     Service Not Asked    Blood Transfusions Not Asked    Caffeine Concern Not Asked    Occupational Exposure Not Asked    Hobby Hazards Not Asked    Sleep Concern Not Asked    Stress Concern Not Asked    Weight Concern Not Asked    Special Diet Not Asked    Back Care Not Asked    Exercise Not Asked    Bike Helmet Not Asked   [address] [facility] Not Asked    Self-Exams Not Asked   Social History Narrative    Not on file          Objective:     Physical Exam:     Visit Vitals  BP (!) 114/41   Pulse (!) 119   Temp 98.9 °F (37.2 °C)   Resp 16   Ht 5' 4\" (1.626 m)   Wt 81.2 kg (179 lb)   LMP 04/06/2020   SpO2 100%   Breastfeeding Unknown   BMI 30.73 kg/m²      O2 Device: Room air      Constitutional:   Chaperone: accepted and present. General Appearance: healthy-appearing, well-nourished, and well-developed; white female. Level of Distress: NAD. Ambulation: ambulating normally. Psychiatric:   Insight: good judgement. Mental Status: normal mood and affect and active and alert. Orientation: to time, place, and person. Memory: recent memory normal and remote memory normal.     Head: Head: normocephalic and atraumatic. Lungs:   Respiratory effort: no dyspnea. Auscultation: no wheezing, rales/crackles, or rhonchi and breath sounds normal, good air movement, and CTA except as noted. Cardiovascular:   Heart Auscultation: normal S1 and S2; no murmurs, rubs, or gallops; and RRR. Pulses including femoral / pedal: normal throughout. Abdomen: Bowel Sounds: normal.   Inspection and Palpation: Soft, gravid, no tenderness, guarding, masses, rebound tenderness, or CVA tenderness and non-distended. Fetal heart tracin-140 bpm baseline with moderate variability and accelerations  Tocodynamometer: Occasional contractions    Cervix: 1 cm / 20%/-2/posterior/medium consistency    Musculoskeletal[de-identified]   Motor Strength and Tone: normal tone and motor strength. Joints, Bones, and Muscles: no contractures, malalignment, tenderness, or bony abnormalities and normal movement of all extremities. Extremities: Trace edema; no cyanosis,  varicosities, or palpable cord. Neurologic:   Gait and Station: normal gait and station. Sensation: grossly intact. Reflexes: DTRs 2+ bilaterally throughout. Skin:   Inspection and palpation: no rash, lesions, ulcer, induration, nodules, jaundice, or abnormal nevi and good turgor. Nails: normal.     Back: Thoracolumbar Appearance: normal curvature. 24 Hour Results:    Recent Results (from the past 24 hour(s))   CBC WITH AUTOMATED DIFF    Collection Time: 21  6:30 AM   Result Value Ref Range    WBC 12.7 (H) 3.6 - 11.0 K/uL    RBC 4.10 3.80 - 5.20 M/uL    HGB 10.5 (L) 11.5 - 16.0 g/dL    HCT 32.9 (L) 35.0 - 47.0 %    MCV 80.2 80.0 - 99.0 FL    MCH 25.6 (L) 26.0 - 34.0 PG    MCHC 31.9 30.0 - 36.5 g/dL    RDW 14.3 11.5 - 14.5 %    PLATELET 095 (H) 136 - 400 K/uL    MPV 9.6 8.9 - 12.9 FL    NEUTROPHILS 64 32 - 75 %    LYMPHOCYTES 25 12 - 49 %    MONOCYTES 9 5 - 13 %    EOSINOPHILS 1 0 - 7 %    BASOPHILS 0 0 - 1 %    IMMATURE GRANULOCYTES 1 (H) 0.0 - 0.5 %    ABS. NEUTROPHILS 8.2 (H) 1.8 - 8.0 K/UL    ABS.  LYMPHOCYTES 3.2 0.8 - 3.5 K/UL    ABS. MONOCYTES 1.1 (H) 0.0 - 1.0 K/UL    ABS. EOSINOPHILS 0.1 0.0 - 0.4 K/UL    ABS. BASOPHILS 0.1 0.0 - 0.1 K/UL    ABS. IMM. GRANS. 0.1 (H) 0.00 - 0.04 K/UL    DF AUTOMATED     URINALYSIS W/ REFLEX CULTURE    Collection Time: 21  6:30 AM    Specimen: Urine   Result Value Ref Range    Color Yellow/Straw      Appearance Clear Clear      Specific gravity 1.014 1.003 - 1.030      pH (UA) 6.0 5.0 - 8.0      Protein Negative Negative mg/dL    Glucose Negative Negative mg/dL    Ketone Negative Negative mg/dL    Bilirubin Negative Negative      Blood Negative Negative      Urobilinogen 0.1 0.1 - 1.0 EU/dL    Nitrites Negative Negative      Leukocyte Esterase Negative Negative      UA:UC IF INDICATED PENDING     WBC 0-4 0 - 4 /hpf    RBC 0-5 0 - 5 /hpf    Bacteria 1+ (A) Negative /hpf    Mucus Trace /lpf   HEP B SURFACE AG    Collection Time: 21  6:30 AM   Result Value Ref Range    Hepatitis B surface Ag <0.10 Index    Hep B surface Ag Interp. Negative Negative   HIV 1/2 AG/AB, 4TH GENERATION,W RFLX CONFIRM    Collection Time: 21  6:30 AM   Result Value Ref Range    HIV 1/2 Interpretation NONREACTIVE NONREACTIVE    HIV 1/2 result comment SEE NOTE     RUBELLA AB, IGG    Collection Time: 21  6:30 AM   Result Value Ref Range    Rubella, IgG Qt. 21.50 IU/mL    Rubella IgG, QL REACTIVE    TYPE & SCREEN    Collection Time: 21  6:45 AM   Result Value Ref Range    Crossmatch Expiration 2021,2359     ABO/Rh(D) AB Positive     Antibody screen Negative          Imaging:   No orders to display        Assessment:     40-week gestation  Prior        Plan:     Cervical ripening, with Cook cervical ripening balloon  Induction of labor    Please note patient has previously voiced understanding regarding the potential risks of vaginal birth after  section, as well as pertinent alternatives such as repeat  section.        Home medications were reviewed    Signed By: Tootie Downs MD     January 22, 2021

## 2021-01-23 NOTE — PROGRESS NOTES
2910: Pt transferred to [facility] via wheelchair from L&D and writer assumed care of patient after bedside report. 0055: VS obtained and shift assessment completed. Pt denies chest pain, shortness of breath, or feeling like heart is racing. Pt also denies dizziness. Writer informed in report that patient has anxiety and heart rate has been consistently in the 110s. Pt and significant other oriented to room, Mommy manual, birth certificate worksheet, [facility] medication side effect information sheet, hourly rounding/bedside reporting, post-birth warning signs, community resources, and how to call nursery at ext. [phone]. Telephone provided within reach. Pt also instructed to call for assistance from nurse before getting out of bed again and call bell placed within reach. Pt voiced understanding. Water given and significant other has pillows and blankets to spend the night. Pt denies needing anything else at this time. 0241: Rounded. Pt sitting up in bed awake and states doing ok. Pt denies needing anything. 0315: Pt ambulated to bathroom with assistance from [facility]. Steady gait noted and no c/o dizziness. Pt voided large amount lochia tinged urine into toilet and pericare education done. Pt educated how to use pericare bottle with warm water to rinse outside of perineum and wiping front to back to prevent infection. Pt shown how to use ice packs, tucks pads, and pramoxine foam for comfort. Pt denies having any questions and ambulated back to bed. Pt educated may be up ad [place] but must call for assistance from nurse before getting out of bed again if experiencing dizziness or lightheadedness. Pt voiced understanding and call bell within reach. No other needs expressed. 6693: Rounded. VS obtained. Fundus and lochia reassessed. Pt expressing concerns about what to do when she feels like she needs to have a BM.  Pt educated to avoid holding it and also reminded to drink 2-3 liters of water daily to prevent constipation. Pt also educated about orders for stool softener. Pt denies having any further questions and denies needing anything. 3495: Rounded with shift report. Report given to CHOLO Pino RN. Chux pads, ice, and water given per request. Pt educated to ambulate in hallways 3 times per day for 15-20 minutes each time to prevent constipation.

## 2021-01-23 NOTE — PROGRESS NOTES
Postpartum #1/female infant/AB+/rubella immune/GBS negative    Patient complains of perineal discomfort, but is otherwise doing well. Breast-feeding       Patient Vitals for the past 24 hrs:   Temp Pulse Resp BP SpO2   01/23/21 0740 98.2 °F (36.8 °C) (!) 112 18 121/74 98 %   01/23/21 0524 98.5 °F (36.9 °C) (!) 106 20 115/76 98 %   01/23/21 0055 98.7 °F (37.1 °C) (!) 116 18 100/74 96 %   01/23/21 0000 98.7 °F (37.1 °C) (!) 110 18 (!) 114/54    01/22/21 2300  (!) 119  (!) 114/41    01/22/21 2245 98.9 °F (37.2 °C) (!) 131 16 116/77    01/22/21 2230  (!) 127  114/61    01/22/21 2145  (!) 148  114/72    01/22/21 2130  (!) 184  (!) 157/47    01/22/21 2115  (!) 139  (!) 140/83    01/22/21 2100  (!) 137  139/68    01/22/21 2045  (!) 127  131/60    01/22/21 2030  (!) 114  134/68    01/22/21 2015  (!) 124  127/83    01/22/21 2000  (!) 112  (!) 110/45    01/22/21 1945  (!) 117  (!) 124/54    01/22/21 1930  (!) 114  134/75    01/22/21 1915 98.8 °F (37.1 °C) (!) 113 17 (!) 112/52    01/22/21 1900  (!) 102  (!) 104/58    01/22/21 1600  82 15 (!) 106/47 100 %     Fundus firm at U- 2 nontender  Lochia scant  Breast soft nontender  Extremities negative for cord/tenderness    No results found for this or any previous visit (from the past 24 hour(s)).     Assessment:  Postpartum #1 stable    Plan  Encourage breast-feeding  Consider for discharge tomorrow

## 2021-01-23 NOTE — PROGRESS NOTES
[facility] care of this patient    1915 Bedside assessment complete, pt afebrile, with pressure felt to lower perineum, aware a pca is available    1930 Dr Leroy Orozco to room, updated on patient feeling pressure, and having slight early decelerations. VE 7/80/-1 SROM on ve check. 1955 Patient pressed pca button, writer aware    2015 Patient still with pain to lower abd, has pressed pca twice, requesting redose. Ve checked found to be 7-8/90/-1, with some cao still felt on exam    2020 Dr Leroy Orozco on floor, updated on ve and need for a redose from CRNA, agrees with assessment and plan. 2030 Pt given redose by CANELO. Kristan Jc, reports \"some\" relief. Bp WNL    2100 Pt reporting increase and pressure and describes as certain. VE 10/100/+1   Dr Leroy Orozco made aware and will come to bedside    2108 Dr Leroy Orozco at bedside, orders to start psuhing    2111 Pushing started    2120 Pt pushing well with Dr Leroy Orozco, staff and physician student in room    2130 Delivery of viable female infant    2200 Recovery period started. Patient with ice pack in place to perineum repair    2300 Recovery period continues    2345 [place] d/c'd. Patient regaining feeling to BLE    2350 Epidural removed  Rose Marie pad changed    0000 Patient offered and accepted food/drink- tolerating well. 0010 PP nurse aware, will try to transfer in 20 mins    305 [facility] aware of pending transfer    0030 Postpartum panties applied, pt ambulated to w/c, with 1 asst.  Pt personal belogs sent with spouse, infant transported to pp unit by VERONICA Robbins RN    1930 Pt transferred to pp 1700 [facility] RN assuming care

## 2021-01-24 VITALS
BODY MASS INDEX: 30.56 KG/M2 | OXYGEN SATURATION: 99 % | WEIGHT: 179 LBS | SYSTOLIC BLOOD PRESSURE: 115 MMHG | DIASTOLIC BLOOD PRESSURE: 77 MMHG | RESPIRATION RATE: 18 BRPM | HEART RATE: 96 BPM | TEMPERATURE: 98.2 F | HEIGHT: 64 IN

## 2021-01-24 PROCEDURE — 74011250637 HC RX REV CODE- 250/637: Performed by: OBSTETRICS & GYNECOLOGY

## 2021-01-24 RX ORDER — OXYCODONE AND ACETAMINOPHEN 5; 325 MG/1; MG/1
1 TABLET ORAL
Qty: 15 TAB | Refills: 0 | Status: SHIPPED | OUTPATIENT
Start: 2021-01-24 | End: 2021-01-27

## 2021-01-24 RX ORDER — IBUPROFEN 800 MG/1
800 TABLET ORAL 3 TIMES DAILY
Qty: 90 TAB | Refills: 1 | Status: SHIPPED | OUTPATIENT
Start: 2021-01-24

## 2021-01-24 RX ADMIN — IBUPROFEN 800 MG: 800 TABLET ORAL at 08:37

## 2021-01-24 NOTE — DISCHARGE INSTRUCTIONS
Patient Education   Patient Education   Patient Education   Patient Education        Depression After Childbirth: Care Instructions  Your Care Instructions     Many women get the \"baby blues\" during the first few days after childbirth. You may lose sleep, feel irritable, and cry easily. You may feel happy one minute and sad the next. Hormone changes are one cause of these emotional changes. Also, the demands of a new baby, along with visits from relatives or other family needs, add to a mother's stress. The \"baby blues\" often peak around the fourth day. Then they ease up in less than 2 weeks. If your moodiness or anxiety lasts for more than 2 weeks, or if you feel like life is not worth living, you may have postpartum depression. This is different for each mother. Some mothers with serious depression may worry intensely about their infant's well-being. Others may feel distant from their child. Some mothers might even feel that they might harm their baby. A mother may have signs of paranoia, wondering if someone is watching her. Depression is not a sign of weakness. It is a medical condition that requires treatment. Medicine and counseling often work well to reduce depression. Talk to your doctor about taking antidepressant medicine while breastfeeding. Follow-up care is a key part of your treatment and safety. Be sure to make and go to all appointments, and call your doctor if you are having problems. It's also a good idea to know your test results and keep a list of the medicines you take. How do you know if you are depressed? With all the changes in your life, you may not know if you are depressed. Pregnancy sometimes causes changes in how you feel that are similar to the symptoms of depression. Symptoms of depression include:  · Feeling sad or hopeless and losing interest in daily activities. These are the most common symptoms of depression. · Sleeping too much or not enough. · Feeling tired.  You may feel as if you have no energy. · Eating too much or too little. · Writing or talking about death, such as writing suicide notes or talking about guns, knives, or pills. Keep the numbers for these national suicide hotlines: 2-225-673-TALK (7-728.205.5373) and 0-461-JSWXGBL (8-116.458.9587). If you or someone you know talks about suicide or feeling hopeless, get help right away. How can you care for yourself at home? · Be safe with medicines. Take your medicines exactly as prescribed. Call your doctor if you think you are having a problem with your medicine. · Eat a healthy diet so that you can keep up your energy. · Get regular daily exercise, such as walks, to help improve your mood. · Get as much sunlight as possible. Keep your shades and curtains open. Get outside as much as you can. · Avoid using alcohol or other substances to feel better. · Get as much rest and sleep as possible. Avoid doing too much. Being too tired can increase depression. · Play stimulating music throughout your day and soothing music at night. · Schedule outings and visits with friends and family. Ask them to call you regularly, so that you do not feel alone. · Ask for help with preparing food and other daily tasks. Family and friends are often happy to help a mother with a . · Be honest with yourself and those who care about you. Tell them about your struggle. · Join a support group of new mothers. No one can better understand the challenges of caring for a  than other new mothers. · If you feel like life is not worth living or are feeling hopeless, get help right away. Keep the numbers for these national suicide hotlines: -TALK (2-287.945.7613) and 2-804-TBJYKMH (2-227.875.5909). When should you call for help? Call 911 anytime you think you may need emergency care. For example, call if:    · You feel you cannot stop from hurting yourself, your baby, or someone else.    Call your doctor now or seek immediate medical care if:    · You are having trouble caring for yourself or your baby.     · You hear voices. Watch closely for changes in your health, and be sure to contact your doctor if:    · You have problems with your depression medicine.     · You do not get better as expected. Where can you learn more? Go to http://www.gray.com/  Enter [id number] in the search box to learn more about \"Depression After Childbirth: Care Instructions. \"  Current as of: January 31, 2020               Content Version: 12.6  © 6090-9659 [employer]. Care instructions adapted under license by [employer] (which disclaims liability or warranty for this information). If you have questions about a medical condition or this instruction, always ask your healthcare professional. [address] any warranty or liability for your use of this information. Vaginal Childbirth: Care Instructions  Overview     Vaginal birth means delivering a baby through the birth canal (vagina). During labor, the uterus tightens (contracts) regularly to thin and open the cervix and to push the baby out through the birth canal.  Your body will slowly heal in the next few weeks. It's easy to get too tired and overwhelmed during the first weeks after your baby is born. Changes in your hormones can shift your mood without warning. You may find it hard to meet the extra demands on your energy and time. Take it easy on yourself. Follow-up care is a key part of your treatment and safety. Be sure to make and go to all appointments, and call your doctor if you are having problems. It's also a good idea to know your test results and keep a list of the medicines you take. How can you care for yourself at home? Vaginal bleeding and cramps  · After delivery, you will have a bloody discharge from your vagina. This will turn pink within a week and then white or yellow after about 10 days. It may last for 2 to 4 weeks or longer, until the uterus has healed. Use pads instead of tampons until you stop bleeding.  · Don't worry if you pass some blood clots, as long as they are smaller than a golf ball. If you have a tear or stitches in your vaginal area, change the pad at least every 4 hours. This will help prevent soreness and infection.  · You may have cramps for the first few days after childbirth. These are normal and occur as the uterus shrinks to normal size. Take an over-the-counter pain medicine, such as acetaminophen (Tylenol), ibuprofen (Advil, Motrin), or naproxen (Aleve), for cramps. Read and follow all instructions on the label. Do not take aspirin, because it can cause more bleeding.  · Do not take two or more pain medicines at the same time unless the doctor told you to. Many pain medicines have acetaminophen, which is Tylenol. Too much acetaminophen (Tylenol) can be harmful.  Stitches  · If you have stitches, they will dissolve on their own and don't need to be removed. Follow your doctor's instructions for cleaning the stitched area.  · Put ice or a cold pack on your painful area for 10 to 20 minutes at a time, several times a day, for the first few days. Put a thin cloth between the ice and your skin.  · Sit in a few inches of warm water (sitz bath) 3 times a day and after bowel movements. The warm water helps with pain and itching. If you don't have a tub, a warm shower might help.  Breast fullness  · Your breasts may overfill (engorge) in the first few days after delivery. To help milk flow and to relieve pain, warm your breasts in the shower or by using warm, moist towels before nursing.  · If you aren't nursing, don't put warmth on your breasts or touch your breasts. Wear a tight bra or sports bra and use ice until the fullness goes away. This usually takes 2 to 3 days.  · Put ice or a cold pack on your breast after nursing to reduce swelling and pain. Put a thin cloth between the  ice and your skin. Activity  · Eat a balanced diet. Don't try to lose weight by cutting calories. Keep taking your prenatal vitamins, or take a multivitamin. · Get as much rest as you can. Try to take naps when your baby sleeps during the day. · Get some exercise every day. But don't do any heavy exercise until your doctor says it is okay. · Wait until you are healed (about 4 to 6 weeks) before you have sexual intercourse. Your doctor will tell you when it is okay to have sex. · Talk to your doctor about birth control. You can get pregnant even before your period returns. Also, you can get pregnant while you are breastfeeding. Mental health  · It's normal to have some sadness, anxiety, sleeplessness, and mood swings after you go home. If you feel upset or hopeless for more than a few days or are having trouble doing the things you need to do, talk to your doctor. Constipation and hemorrhoids  · Drink plenty of fluids, enough so that your urine is light yellow or clear like water. If you have kidney, heart, or liver disease and have to limit fluids, talk with your doctor before you increase the amount of fluids you drink. · Eat plenty of fiber each day. Have a bran muffin or bran cereal for breakfast. Try eating a piece of fruit for a mid-afternoon snack. · For painful, itchy hemorrhoids, put ice or a cold pack on the area several times a day for 10 minutes at a time. Follow this by putting a warm compress on the area for another 10 to 20 minutes or by sitting in a shallow, warm bath. When should you call for help? Call  911 anytime you think you may need emergency care. For example, call if:    · You have thoughts of harming yourself, your baby, or another person.     · You passed out (lost consciousness).     · You have chest pain, are short of breath, or cough up blood.     · You have a seizure.    Call your doctor now or seek immediate medical care if:    · You have severe vaginal bleeding.     · You are dizzy or lightheaded, or you feel like you may faint.     · You have a fever.     · You have new or more pain in your belly or pelvis.     · You have symptoms of a blood clot in your leg (called a deep vein thrombosis), such as:  ? Pain in the calf, back of the knee, thigh, or groin. ? Redness and swelling in your leg or groin.     · You have signs of preeclampsia, such as:  ? Sudden swelling of your face, hands, or feet. ? New vision problems (such as dimness, blurring, or seeing spots). ? A severe headache. Watch closely for changes in your health, and be sure to contact your doctor if:    · Your vaginal bleeding seems to be getting heavier.     · You have new or worse vaginal discharge.     · You feel sad, anxious, or hopeless for more than a few days.     · You do not get better as expected. Where can you learn more? Go to http://www.gray.com/  Enter Q237 in the search box to learn more about \"Vaginal Childbirth: Care Instructions. \"  Current as of: February 11, 2020               Content Version: 12.6  © 5823-2601 [employer] Care instructions adapted under license by [employer] (which disclaims liability or warranty for this information). If you have questions about a medical condition or this instruction, always ask your healthcare professional. [place] [address] any warranty or liability for your use of this information. Nutrition for Breastfeeding Mothers: Care Instructions  Your Care Instructions     If you are breastfeeding, your doctor may suggest that you eat more calories each day than otherwise recommended for a person of your height and weight. Breastfeeding helps build the bond between you and your baby. It gives your baby excellent health benefits.   A healthy diet includes eating a variety of foods from the basic food groups: grains, vegetables, fruits, milk and milk products (such as cheese and yogurt), and meat and dried beans. Eating well during breastfeeding will ensure that you stay healthy. Follow-up care is a key part of your treatment and safety. Be sure to make and go to all appointments, and call your doctor if you are having problems. It's also a good idea to know your test results and keep a list of the medicines you take. How can you care for yourself at home? Making good choices about what you eat and drink when you are breastfeeding can help you stay healthy. It can also help your baby stay healthy. Here are some things you can do. Eat a variety of healthy foods. This includes vegetables, fruits, milk products, whole grains, and protein. Drink plenty of fluids. Make water your first choice. People who drink enough fluids usually have urine that is light yellow to clear. If you have kidney, heart, or liver disease and have to limit fluids, talk with your doctor before you increase your fluids. Avoid fish with high mercury. This includes shark, swordfish, osmany mackerel, and marlin. It also includes orange roughy, bigeye tuna, and tilefish from the [place] [facility]. Instead, eat fish that is low in mercury. Choose canned light tuna, salmon, pollock, catfish, or shrimp. Limit caffeine. Things like coffee, tea, chocolate, and some sodas can contain caffeine. Caffeine can pass through breast milk to your baby. It may cause fussiness and sleep problems. Talk to your doctor about how much caffeine is safe for you. Limit alcohol. Alcohol can pass through breast milk to your baby. Talk to your doctor if you have questions about drinking alcohol while breastfeeding. Be safe with supplements. Talk with your doctor before taking any vitamins, minerals, and herbal or other dietary supplements. When should you call for help? Watch closely for changes in your health, and be sure to contact your doctor if you have any problems. Where can you learn more?   Go to http://www.gray.com/  Enter P234 in the search box to learn more about \"Nutrition for Breastfeeding Mothers: Care Instructions. \"  Current as of: February 11, 2020               Content Version: 12.6  © 7983-0942 [employer]. Care instructions adapted under license by [employer] (which disclaims liability or warranty for this information). If you have questions about a medical condition or this instruction, always ask your healthcare professional. [address] any warranty or liability for your use of this information. Breastfeeding: Care Instructions  Overview     Breastfeeding has many benefits. It may lower your baby's chances of getting an infection. It also may make it less likely that your baby will have problems such as diabetes and obesity later in life. Breastfeeding also helps you bond with your baby. In the first days after birth, your breasts make a thick, yellow liquid called colostrum. This liquid gives your baby nutrients and antibodies against infection. It is all that babies need in the first days after birth. Your breasts will fill with milk a few days after the birth. Breastfeeding is a skill that gets better with practice. Be patient with yourself and your baby. If you have trouble, you can get help and keep breastfeeding. Follow-up care is a key part of your treatment and safety. Be sure to make and go to all appointments, and call your doctor if you are having problems. It's also a good idea to know your test results and keep a list of the medicines you take. How can you care for yourself at home? · Breastfeed your baby whenever he or she is hungry. In the first 2 weeks, your baby will breastfeed at least 8 times in a 24-hour period. This will help you keep up your supply of milk. Signs that your baby is hungry include:  ? Sucking on his or her hands. ? [place] his or her lips.   ? Turning his or her head toward your breast.  · Put a bed pillow or a nursing pillow on your lap to support your arms and your baby. · Hold your baby in a comfortable position. ? You can hold your baby in several ways. One of the most common positions is the cradle hold. One arm supports your baby, with his or her head in the bend of your elbow. Your open hand supports your baby's bottom or back. Your baby's belly lies against yours. ? If you had your baby by , or , try the football hold. This position keeps your baby off your belly. Tuck your baby under your arm, with his or her body along the side you will be feeding on. Support your baby's upper body with your arm. With that hand you can control your baby's head to bring his or her mouth to your breast.  ? Try different positions with each feeding. If you are having problems, ask for help from your doctor or a lactation consultant. · To get your baby to latch on:  ? Support and narrow your breast with one hand using a \"U hold,\" with your thumb on the outer side of your breast and your fingers on the inner side. You can also use a \"C hold,\" with all your fingers below the nipple and your thumb above it. Try the different holds to get the deepest latch for whichever breastfeeding position you use. Your other arm is behind your baby's back, with your hand supporting the base of the baby's head. Position your fingers and thumb to point toward your baby's ears. ? You can touch your baby's lower lip with your nipple to get your baby to open his or her mouth. Wait until your baby opens up really wide, like a big yawn. Then be sure to bring the baby quickly to your breast--not your breast to the baby. As you bring your baby toward your breast, use your other hand to support the breast and guide it into his or her mouth. ? Both the nipple and a large portion of the darker area around the nipple (areola) should be in the baby's mouth.  The baby's lips should be flared outward, not folded in (inverted). ? Listen for a regular sucking and swallowing pattern while the baby is feeding. If you cannot see or hear a swallowing pattern, watch the baby's ears, which will wiggle slightly when the baby swallows. If the baby's nose appears to be blocked by your breast, bring your baby's body closer to you. This will help tilt the baby's head back slightly, so just the edge of one nostril is clear for breathing. ? When your baby is latched, you can usually remove your hand from supporting your breast and bring it under your baby to cradle him or her. Now just relax and breastfeed your baby. · You will know that your baby is feeding well when:  ? His or her mouth covers a lot of the areola, and the lips are flared out.  ? His or her chin and nose rest against your breast.  ? Sucking is deep and rhythmic, with short pauses. ? You are able to see and hear your baby swallowing. ? You do not feel pain in your nipple. · Offer both breasts to your baby at each feeding. Each time you breastfeed, switch which breast you start with. · Anytime you need to remove your baby from the breast, put one finger in the corner of his or her mouth. Push your finger between your baby's gums to gently break the seal. If you do not break the tight seal before you remove your baby, your nipples can become sore, cracked, or bruised. · After feeding your baby, gently pat his or her back to let out any swallowed air. After your baby burps, offer the breast again, or offer the other breast. Sometimes a baby will want to keep feeding after being burped. When should you call for help? Call your doctor now or seek immediate medical care if:    · You have symptoms of a breast infection, such as:  ? Increased pain, swelling, redness, or warmth around a breast.  ? Red streaks extending from the breast.  ? Pus draining from a breast.  ? A fever.     · Your baby has no wet diapers for 6 hours.    Watch closely for changes in your health, and be sure to contact your doctor if:    · Your baby has trouble latching on to your breast.     · You continue to have pain or discomfort when breastfeeding.     · You have other questions or concerns. Where can you learn more? Go to http://www.gray.com/  Enter P492 in the search box to learn more about \"Breastfeeding: Care Instructions. \"  Current as of: February 11, 2020               Content Version: 12.6  © 2006-2020 [employer]. Care instructions adapted under license by [employer] (which disclaims liability or warranty for this information). If you have questions about a medical condition or this instruction, always ask your healthcare professional. [address] any warranty or liability for your use of this information.

## 2021-01-24 NOTE — PROGRESS NOTES
1. Prior sectionfor  induction tomorrow ([employer] cervical ripening balloon)  2.   Fetal pyelectasis

## 2021-01-24 NOTE — DISCHARGE SUMMARY
DISCHARGE SUMMARY    ADMITTING DIAGNOSIS: 40-week gestation, prior  section    DISCHARGE DIAGNOSIS: Same, delivered    PROCEDURES PERFORMED:     HISTORY OF PRESENT ILLNESS:30 y.o. female,  2 para 0-1-0-1, [facility] 2021, estimated gestational age 43 weeks 0 days, with prenatal care at [facility] notable for prior  section, admitted for induction of labor. OBJECTIVE:  VITALS:  Visit Vitals  /77 (BP Patient Position: At rest)   Pulse 96   Temp 98.2 °F (36.8 °C)   Resp 18   Ht 5' 4\" (1.626 m)   Wt 81.2 kg (179 lb)   LMP 2020   SpO2 99%   Breastfeeding Unknown   BMI 30.73 kg/m²     abdomen: Uterus firm at U- 3 nontender  Lochia scant  Breast soft nontender  Extremities negative for cord/tenderness    LABORATORY:  Admission on 2021   Component Date Value Ref Range Status    WBC 2021 12.7* 3.6 - 11.0 K/uL Final    RBC 2021 4.10  3.80 - 5.20 M/uL Final    HGB 2021 10.5* 11.5 - 16.0 g/dL Final    HCT 2021 32.9* 35.0 - 47.0 % Final    MCV 2021 80.2  80.0 - 99.0 FL Final    MCH 2021 25.6* 26.0 - 34.0 PG Final    MCHC 2021 31.9  30.0 - 36.5 g/dL Final    RDW 2021 14.3  11.5 - 14.5 % Final    PLATELET  718* 150 - 400 K/uL Final    MPV 2021 9.6  8.9 - 12.9 FL Final    NEUTROPHILS 2021 64  32 - 75 % Final    LYMPHOCYTES 2021 25  12 - 49 % Final    MONOCYTES 2021 9  5 - 13 % Final    EOSINOPHILS 2021 1  0 - 7 % Final    BASOPHILS 2021 0  0 - 1 % Final    IMMATURE GRANULOCYTES 2021 1* 0.0 - 0.5 % Final    ABS. NEUTROPHILS 2021 8.2* 1.8 - 8.0 K/UL Final    ABS. LYMPHOCYTES 2021 3.2  0.8 - 3.5 K/UL Final    ABS. MONOCYTES 2021 1.1* 0.0 - 1.0 K/UL Final    ABS. EOSINOPHILS 2021 0.1  0.0 - 0.4 K/UL Final    ABS. BASOPHILS 2021 0.1  0.0 - 0.1 K/UL Final    ABS. IMM.  GRANS. 2021 0.1* 0.00 - 0.04 K/UL Final    DF 01/22/2021 AUTOMATED    Final    Color 01/22/2021 Yellow/Straw    Final    Color Reference Range: Straw, Yellow or Dark Yellow    Appearance 01/22/2021 Clear  Clear   Final    Specific gravity 01/22/2021 1.014  1.003 - 1.030   Final    pH (UA) 01/22/2021 6.0  5.0 - 8.0   Final    Protein 01/22/2021 Negative  Negative mg/dL Final    Glucose 01/22/2021 Negative  Negative mg/dL Final    Ketone 01/22/2021 Negative  Negative mg/dL Final    Bilirubin 01/22/2021 Negative  Negative   Final    Blood 01/22/2021 Negative  Negative   Final    Urobilinogen 01/22/2021 0.1  0.1 - 1.0 EU/dL Final    Nitrites 01/22/2021 Negative  Negative   Final    Leukocyte Esterase 01/22/2021 Negative  Negative   Final    UA:UC IF INDICATED 01/22/2021 PENDING   Incomplete    WBC 01/22/2021 0-4  0 - 4 /hpf Final    RBC 01/22/2021 0-5  0 - 5 /hpf Final    Bacteria 01/22/2021 1+* Negative /hpf Final    Mucus 01/22/2021 Trace  /lpf Final    Hepatitis B surface Ag 01/22/2021 <0.10  Index Final    Hep B surface Ag Interp. 01/22/2021 Negative  Negative Final    HIV 1/2 Interpretation 01/22/2021 NONREACTIVE  NONREACTIVE Final    HIV 1/2 result comment 01/22/2021 SEE NOTE    Final    Comment: While this assay is highly sensitive, a non-reactive/negative result  for HIV antibodies HIV-1 and HIV-2 and p24 antigen, does not exclude  the possibility of exposure to or infection with HIV. HIV antibodies  and/or p24 antigen may be undetectable in some stages of the  infection and in some clinical conditions. Test performed by the [employer] HIV Ag/Ab Combo (CHIV), 4th  generation assay. Recommend consulting relevant CDC guidelines for  informing test subject of the result and its interpretation.  Rubella, IgG Qt. 01/22/2021 21.50  IU/mL Final    Rubella IgG, QL 01/22/2021 REACTIVE   Final    Comment: IgG antibody to rubella detected, which may indicate a current or  previous exposure or immunization to rubella.   Results may vary depending on . Method used is [employer]      Crossmatch Expiration 01/22/2021 01/25/2021,2359   Final    ABO/Rh(D) 01/22/2021 AB Positive   Final    Antibody screen 01/22/2021 Negative   Final    HIV, External 06/10/2020 negative   Final    RPR, External 06/10/2020 nonreactive   Final    HBsAg, External 06/10/2020 negative   Final    Rubella, External 06/10/2020 immune   Final    GrBStrep, External 06/10/2020 negative   Final    Special Requests: 01/22/2021 No Special Requests    Final    Culture result: 01/22/2021 No Growth (<1000 cfu/mL)    Final   Hospital Outpatient Visit on 01/18/2021   Component Date Value Ref Range Status    SARS-CoV-2 01/18/2021 Please find results under separate order    Final    SARS-CoV-2 01/18/2021 Not Detected  Not Detected Final    Comment: This nucleic acid amplification test was developed and its  performance characteristics determined by [employer]. Nucleic acid amplification tests include PCR and TMA. This test has  not been FDA cleared or approved. This test has been authorized by  FDA under an Emergency Use Authorization (EUA). This test is only  authorized for the duration of time the declaration that  circumstances exist justifying the authorization of the emergency use  of in vitro diagnostic tests for detection of SARS-CoV-2 virus and/or  diagnosis of COVID-19 infection under section 564(b)(1) of the Act,  21 U. S.C. 973RSJ-1(H) (1), unless the authorization is terminated or  revoked sooner. When diagnostic testing is negative, the possibility of a false  negative result should be considered in the context of a patient's  recent exposures and the presence of clinical signs and symptoms  consistent with COVID-19. An individual without symptoms of COVID-  19 and who is not shedding SARS-CoV-2 virus woul                           d expect to have a  negative (not detected) result in this assay.   Performed At: [facility] [address], [address] [facility] [id number]  Ruddy Cruz MD NK:[id number]     Routine Prenatal on 01/13/2021   Component Date Value Ref Range Status    Color (UA POC) 01/13/2021 Yellow   Final    Clarity (UA POC) 01/13/2021 Clear   Final    Glucose (UA POC) 01/13/2021 Negative  Negative Final    Bilirubin (UA POC) 01/13/2021 Negative  Negative Final    Ketones (UA POC) 01/13/2021 Negative  Negative Final    Specific gravity (UA POC) 01/13/2021 1.025  1.001 - 1.035 Final    Blood (UA POC) 01/13/2021 Negative  Negative Final    pH (UA POC) 01/13/2021 6.5  4.6 - 8.0 Final    Protein (UA POC) 01/13/2021 Negative  Negative Final    Urobilinogen (UA POC) 01/13/2021 0.2 mg/dL  0.2 - 1 Final    Nitrites (UA POC) 01/13/2021 Negative  Negative Final    Leukocyte esterase (UA POC) 01/13/2021 Negative  Negative Final   Hospital Outpatient Visit on 01/07/2021   Component Date Value Ref Range Status    SARS-CoV-2 01/07/2021 Please find results under separate order    Final    SARS-CoV-2 01/07/2021 Not Detected  Not Detected Final    Comment: This nucleic acid amplification test was developed and its  performance characteristics determined by [employer]. Nucleic acid amplification tests include PCR and TMA. This test has  not been FDA cleared or approved. This test has been authorized by  FDA under an Emergency Use Authorization (EUA). This test is only  authorized for the duration of time the declaration that  circumstances exist justifying the authorization of the emergency use  of in vitro diagnostic tests for detection of SARS-CoV-2 virus and/or  diagnosis of COVID-19 infection under section 564(b)(1) of the Act,  21 U. S.C. 199OCD-1(F) (1), unless the authorization is terminated or  revoked sooner.   When diagnostic testing is negative, the possibility of a false  negative result should be considered in the context of a patient's  recent exposures and the presence of clinical signs and symptoms  consistent with COVID-19. An individual without symptoms of COVID-  19 and who is not shedding SARS-CoV-2 virus woul                           d expect to have a  negative (not detected) result in this assay. Performed At: [address] [id number]  Ruddy Cruz MD NR:[id number]     Telephone on 2020   Component Date Value Ref Range Status    STREP GP B CULTURE 2020 Negative  Negative Final    Comment: Centers for Disease Control and Prevention (CDC) and American Congress  of Obstetricians and Gynecologists (ACOG) guidelines for prevention of   group B streptococcal (GBS) disease specify co-collection of  a vaginal and rectal swab specimen to maximize sensitivity of GBS  detection. Per the CDC and ACOG, swabbing both the lower vagina and  rectum substantially increases the yield of detection compared with  sampling the vagina alone. Penicillin G, ampicillin, or cefazolin are indicated for intrapartum  prophylaxis of  GBS colonization. Reflex susceptibility  testing should be performed prior to use of clindamycin only on GBS  isolates from penicillin-allergic women who are considered a high risk  for anaphylaxis. Treatment with vancomycin without additional testing  is warranted if resistance to clindamycin is noted. HOSPITAL COURSE: Patient admitted for cervical ripening and induction of labor, received epidural anesthetic, Cook cervical ripening balloon, Pitocin, progressed to full dilation and had an  of a viable female infant, 7 pounds 14 ounces without complication. Patient was transferred to the postpartum floor where her postpartum course was unremarkable she had normal return of bowel and bladder function tolerated regular diet and was discharged home on postpartum day #2 with prescriptions for Motrin and Percocet and instructions to follow-up in 6 weeks for her postpartum care.     DISCHARGE MEDICATIONS: See med rec    DIET: Regular. Advance as tolerated. ACTIVITY: Advance as tolerated. Pelvic rest for 6 weeks. No heavy lifting    DISCHARGE INSTRUCTIONS: Discharge to home, call for increased pain, fever or bleeding. FOLLOW-UP: Appointment to clinic.

## 2021-01-24 NOTE — PROGRESS NOTES
1925: Pt finished showering and clean gown provided. 1948: Pt talking on cell phone. No needs or concerns expressed. Pt states feel better after shower. 1950: Pt ambulating in hallway with s.o.. Steady gait noted. 2020: Shift assessment completed. Extra towels and washcloths given per request.     2132: Pt sitting up in bed breastfeeding baby. No needs expressed. 2305: VS obtained; fundus and lochia reassessed. Scheduled Motrin given. No other needs expressed. 0102: Pt breastfeeding baby and states doing ok. Pt denies needing anything.    0300: Dr. Davila Eri in to see patient. Pt lying in bed with eyes closed. RR even and unlabored. 0515: Pt lying in bed awake and states doing ok. No needs expressed. 1064: Rounded with shift report and report given to JESSE Forde Sa RN. Pt sitting up in bed holding baby and states doing ok. No needs expressed.

## 2021-01-24 NOTE — PROGRESS NOTES
Pt given discharge instructions. Pt aware when to contact MD. Pt aware she will need to make 6 week follow up appointment. Pt aware medications sent to pharmacy. Pt denies any questions or concerns at this time. Discharge plan of care/case management plan validated with provider discharge order. Pt discharged to main entrance via wheelchair with baby in car seat.  Pt belongs on her person.-[phone]

## 2021-01-25 LAB — RPR SER QL: NONREACTIVE

## 2021-02-03 ENCOUNTER — TELEPHONE (OUTPATIENT)
Dept: OBGYN CLINIC | Age: 31
End: 2021-02-03

## 2021-02-03 NOTE — TELEPHONE ENCOUNTER
Spoke with patient and questioned provider there is very little information at this time about vaccine and lactation and pregnancy advised patient to check [facility] and local health department for further information.

## 2021-02-03 NOTE — TELEPHONE ENCOUNTER
Patient wants to know if its ok for her to get the [place] shot while she is breastfeeding. She would like a call back.

## 2021-03-05 ENCOUNTER — OFFICE VISIT (OUTPATIENT)
Dept: OBGYN CLINIC | Age: 31
End: 2021-03-05
Payer: COMMERCIAL

## 2021-03-05 VITALS
BODY MASS INDEX: 25.23 KG/M2 | WEIGHT: 157 LBS | SYSTOLIC BLOOD PRESSURE: 108 MMHG | DIASTOLIC BLOOD PRESSURE: 67 MMHG | HEIGHT: 66 IN | HEART RATE: 77 BPM

## 2021-03-05 DIAGNOSIS — Z87.42 HISTORY OF ABNORMAL CERVICAL PAP SMEAR: ICD-10-CM

## 2021-03-05 DIAGNOSIS — N91.2 AMENORRHEA: ICD-10-CM

## 2021-03-05 DIAGNOSIS — Z12.4 SCREENING FOR MALIGNANT NEOPLASM OF THE CERVIX: ICD-10-CM

## 2021-03-05 LAB
HCG URINE, QL. (POC): NEGATIVE
VALID INTERNAL CONTROL?: NO

## 2021-03-05 PROCEDURE — 0503F POSTPARTUM CARE VISIT: CPT | Performed by: OBSTETRICS & GYNECOLOGY

## 2021-03-05 PROCEDURE — 81025 URINE PREGNANCY TEST: CPT | Performed by: OBSTETRICS & GYNECOLOGY

## 2021-03-05 RX ORDER — ACETAMINOPHEN AND CODEINE PHOSPHATE 120; 12 MG/5ML; MG/5ML
1 SOLUTION ORAL DAILY
Qty: 30 TAB | Refills: 12 | Status: SHIPPED | OUTPATIENT
Start: 2021-03-05 | End: 2021-09-01

## 2021-03-05 NOTE — PROGRESS NOTES
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 .    Patient's only concern is whether she has pelvic prolapse or not. She says her urethra feels different than it used to feel. She describes clear yellow discharge, but denies vaginal itching odor pelvic pain or vaginal bleeding. She is sexually active with the same partner since . Last sexual encounter was prior to delivery, and she denies any pain or bleeding with intercourse. 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 Patient is a nonsmoker    Last Pap smear2018LGSIL    Past Medical History:   Diagnosis Date    Abnormal Papanicolaou smear of cervix 2019    LSIL    At high risk for breast cancer 2019     Past Surgical History:   Procedure Laterality Date    HX  SECTION      HX COLPOSCOPY      HX FREE SKIN GRAFT      HX OTHER SURGICAL        Social History     Socioeconomic History    Marital status:      Spouse name: [facility]    Number of children: Not on file    Years of education: Not on file    Highest education level: Associate degree: academic program   Social Needs    Financial resource strain: Not hard at all   Zahra-Santos insecurity     Worry: Never true     Inability: Never true   [employer] needs     Medical: No     Non-medical: No   Tobacco Use    Smoking status: Never Smoker    Smokeless tobacco: Never Used   Substance and Sexual Activity    Alcohol use: Not Currently     Comment: Moderate    Drug use: Never    Sexual activity: Yes   Relationships    Social connections     Talks on phone: Three times a week     Gets together:  Three times a week     Attends [employer]oration[employer] service: Not on file     Active member of club or organization: Not on file     Attends meetings of clubs or organizations: Not on file     Relationship status: Not on file   Other Topics Concern      Family History   Problem Relation Age of Onset    Breast Cancer Mother     Hypertension Father     Lung Cancer Maternal Uncle     Lung Cancer Paternal Uncle     Lung Cancer Paternal Grandfather         esophagus      Current Outpatient Medications on File Prior to Visit   Medication Sig Dispense Refill    ibuprofen (MOTRIN) 800 mg tablet Take 1 Tab by mouth three (3) times daily. 90 Tab 1    PNV Comb #2-Iron-FA-Omega 3 29-1-400 mg cmpk Take  by mouth. No current facility-administered medications on file prior to visit.       Allergies as of 03/05/2021 - Review Complete 03/05/2021   Allergen Reaction Noted    Macrodantin [nitrofurantoin macrocrystal] Itching 08/31/2020    Nitrofurantoin Itching 10/06/2016     Constitutional  General Appearance: healthy-appearing, well-nourished, well-developed (white female)  Level of Distress: NAD  Ambulation: ambulating normally    Psychiatric  Insight: good judgement  Mental Status: active and alert, normal mood, normal affect  Orientation: to time, to place, to person  Memory: recent memory normal, remote memory normal    Head  Head: normocephalic, atraumatic    Neck  Neck: supple, trachea midline, no masses, FROM  Lymph Nodes: no cervical LAD, no supraclavicular LAD, no axillary LAD, no inguinal LAD  Thyroid: no enlargement, non-tender, no nodules    Lungs  Respiratory effort: no dyspnea  Auscultation: breath sounds normal, good air movement, CTA except as noted, no wheezing, no rales/crackles, no rhonchi    Cardiovascular  Heart Auscultation: RRR, normal S1, normal S2, no murmurs, no rubs, no gallops  Pulses including femoral / pedal: normal throughout    Abdomen  Bowel Sounds: normal  Inspection and Palpation: soft, good, no tenderness, no guarding, no rebound tenderness, no masses, no CVA tenderness (well-healed Pfannenstiel incision)  Liver: non-tender, no hepatomegaly. Spleen: non-tender, no splenomegaly  Hernia: none palpable    Female :   External genitalia: no lesions or rash and normal.   Vagina: moist mucosa; brown discharge. Cervix: no discharge, inflammation, or cervical motion tenderness   Uterus: midline, smooth, and non-tender; normal size  Adnexae: no adnexal mass or tenderness and size WNL. Bladder and Urethra: normal bladder and urethra (except where noted). Musculoskeletal:  Motor Strength and Tone: normal motor strength, normal tone  Joints, Bones, and Muscles: normal movement of all extremities, no bony abnormalities, no contractures, no malalignment, no tenderness  Extremities: no cyanosis, no edema, no varicosities, no palpable cord    Neurologic  Gait and Station: normal gait, normal station  Sensation: grossly intact  Reflexes: DTRs 2+ bilaterally throughout    Skin  Inspection and palpation: no rash, no lesions, no ulcer, no abnormal nevi, no induration, no nodules, good turgor, no jaundice, tattoo  Nails: normal    Back  Thoracolumbar Appearance: normal curvature      ICD-10-CM ICD-9-CM    1. Routine postpartum follow-up  Z39.2 V24.2    2. Amenorrhea  N91.2 626.0 AMB POC URINE PREGNANCY TEST, VISUAL COLOR COMPARISON   3. Screening for malignant neoplasm of the cervix  Z12.4 V76.2 PAP IG, APTIMA HPV AND RFX 16/18,45 ([id number])   4.  History of abnormal cervical Pap smear  Z87.42 V13.29      Aside from concerns about prolapse, patient is doing well  Check Pap smear  Start Micronor  Follow-up for colposcopy if Pap smear is abnormal  Otherwise follow-up in 1 year

## 2021-03-12 LAB
CYTOLOGIST CVX/VAG CYTO: NORMAL
CYTOLOGY CVX/VAG DOC CYTO: NORMAL
CYTOLOGY CVX/VAG DOC THIN PREP: NORMAL
DX ICD CODE: NORMAL
HPV I/H RISK 4 DNA CVX QL PROBE+SIG AMP: NEGATIVE
Lab: NORMAL
Lab: NORMAL
OTHER STN SPEC: NORMAL
STAT OF ADQ CVX/VAG CYTO-IMP: NORMAL

## 2021-07-24 LAB
APPEARANCE UR: CLEAR
BACTERIA URNS QL MICRO: ABNORMAL /HPF
BILIRUB UR QL: NEGATIVE
COLOR UR: ABNORMAL
GLUCOSE UR STRIP.AUTO-MCNC: NEGATIVE MG/DL
HGB UR QL STRIP: NEGATIVE
KETONES UR QL STRIP.AUTO: NEGATIVE MG/DL
LEUKOCYTE ESTERASE UR QL STRIP.AUTO: NEGATIVE
MUCOUS THREADS URNS QL MICRO: ABNORMAL /LPF
NITRITE UR QL STRIP.AUTO: NEGATIVE
PH UR STRIP: 6 [PH] (ref 5–8)
PROT UR STRIP-MCNC: NEGATIVE MG/DL
RBC #/AREA URNS HPF: ABNORMAL /HPF (ref 0–5)
SP GR UR REFRACTOMETRY: 1.01 (ref 1–1.03)
UA: UC IF INDICATED,UAUC: ABNORMAL
UROBILINOGEN UR QL STRIP.AUTO: 0.1 EU/DL (ref 0.1–1)
WBC URNS QL MICRO: ABNORMAL /HPF (ref 0–4)

## 2022-02-02 ENCOUNTER — TELEPHONE (OUTPATIENT)
Dept: OBGYN CLINIC | Age: 32
End: 2022-02-02

## 2022-02-02 NOTE — TELEPHONE ENCOUNTER
Patient contacted the office requesting a refill on medication. Advised patient that Dr. Pamela Marina is no longer with the office and she will need schedule and appt with provider to establish care and a prescription will be sent at that time to cover her until seen by provider. She reports that she thought she could just call and get a new prescription for the year. Advised patient that her annual exam is due in March. She reports that she will call back she is going to try to get an appointment with a provider in [place].

## 2022-02-03 ENCOUNTER — TELEPHONE (OUTPATIENT)
Dept: OBGYN CLINIC | Age: 32
End: 2022-02-03

## 2022-02-03 DIAGNOSIS — N94.89 SUPPRESSION OF MENSES: Primary | ICD-10-CM

## 2022-02-03 RX ORDER — ACETAMINOPHEN AND CODEINE PHOSPHATE 120; 12 MG/5ML; MG/5ML
1 SOLUTION ORAL DAILY
Qty: 2 DOSE PACK | Refills: 0 | Status: SHIPPED | OUTPATIENT
Start: 2022-02-03

## 2022-02-03 NOTE — TELEPHONE ENCOUNTER
Patient called back to the reports would like to see if the provider can send her one refill. She just needs it to hold her until she gets an appointment. She reports only has three days left of medication. Patient aware that she is due for her annual next month and will need an appointment. Advised that if prescription is sent no more refills until seen for annual.  She is an old patient of Dr. Osman Dasilva.

## 2022-02-03 NOTE — TELEPHONE ENCOUNTER
Spoke with patient she now reports that she just needs the refills and will schedule with someone else. Advised that if not seen by a provider in our office no additional refills will be sent to her pharmacy.

## 2022-03-19 PROBLEM — Z3A.40 40 WEEKS GESTATION OF PREGNANCY: Status: ACTIVE | Noted: 2021-01-22

## 2022-03-19 PROBLEM — Z91.89 AT HIGH RISK FOR BREAST CANCER: Status: ACTIVE | Noted: 2019-09-27

## 2022-03-20 PROBLEM — O34.211 ENCOUNTER FOR MATERNAL CARE FOR LOW TRANSVERSE SCAR FROM PREVIOUS CESAREAN DELIVERY: Status: ACTIVE | Noted: 2021-01-22

## 2023-05-21 RX ORDER — ACETAMINOPHEN AND CODEINE PHOSPHATE 120; 12 MG/5ML; MG/5ML
0.35 SOLUTION ORAL DAILY
COMMUNITY
Start: 2022-02-03

## 2023-05-21 RX ORDER — IBUPROFEN 800 MG/1
800 TABLET ORAL 3 TIMES DAILY
COMMUNITY
Start: 2021-01-24